# Patient Record
Sex: FEMALE | Race: WHITE
[De-identification: names, ages, dates, MRNs, and addresses within clinical notes are randomized per-mention and may not be internally consistent; named-entity substitution may affect disease eponyms.]

---

## 2017-02-19 NOTE — RAD
CLINICAL HISTORY: Right flank pain



COMPARISON: July 14, 2015



TECHNIQUE: Multiple contiguous axial CT scans were obtained of the abdomen and pelvis,

without intravenous contrast enhancement. Coronal and sagittal multiplanar reformations

are submitted for review.  Oral contrast was not administered.     



FINDINGS: 

The study is limited by the lack of intravenous contrast. This limits evaluation of the

solid organs and vasculature.





LUNG BASES: The lung bases are clear.



LIVER: The liver is diffusely low in attenuation compared to the spleen. There are no

focal hepatic parenchymal masses.

BILE DUCTS: There is no intrahepatic or extrahepatic biliary dilatation.

GALLBLADDER: The gallbladder is normal, without pericholecystic inflammatory change.



PANCREAS: The pancreas is normal, without mass or ductal dilatation.

SPLEEN: Normal in size and appearance.



UPPER GI TRACT: Evaluation of the gastrointestinal tract is limited by incomplete gastric

distention. There is a 1.7 cm diverticulum of the second stage of the duodenum.

SMALL BOWEL AND MESENTERY: The small bowel is normal in contour, course, and caliber.

There is no obstruction or dilatation.

COLON: There are multiple diverticula of the descending and sigmoid colon. There is no

pericolonic inflammatory change. There is a tubular, vermiform, hollow viscus that is

blind ending, and originates from the cecum, consistent with a normal appendix. There is

no periappendiceal inflammatory change. This is best seen on axial images 111 through 120



ADRENALS: Normal bilaterally.

KIDNEYS: A simple right renal cyst is noted. There is no appreciable hydronephrosis or

nephrolithiasis

BLADDER: The bladder is smooth in contour.



PELVIC ORGANS: The pelvic organs are not visualized.



AORTA: The aorta is normal.

IVC: Unremarkable



LYMPH NODES: There is no lymphadenopathy by size criteria.



ABDOMINAL WALL: There is no evidence for abdominal wall hernia.

BONES AND SOFT TISSUES: There are mild diffuse degenerative changes.

OTHER: None



IMPRESSION:

1.  NO HYDRONEPHROSIS OR NEPHROLITHIASIS.

2.  FATTY INFILTRATION OF LIVER.

3.  DIVERTICULOSIS

## 2017-02-19 NOTE — ED
Tamela CORTES Janilya, scribed for William Jones MD on 02/19/17 at 1752 .





Abdominal Pain/Female





- HPI Summary


HPI Summary: 


A 65 y/o female came in to [facility] presenting w/ a gradual onset of constant 

right sided back pain under the ribs starting Tuesday, February 14, 2017. She 

was seen by her PCP on the same day of onset of the pain. Shingles was ruled 

out because there were no rashes. Her urinalysis showed e.coli and was put on 

Abx. For pain management, she was put on Vicodin. 


Pt states the pain is very severe of 8/10 rating. It is localized in the back, 

and sometimes, it radiates to the her right flank. Vicodin temporarily 

decreases the pain to 3/10. Touch makes the pain worse. There is also very mild 

fever. Pt denies diarrhea, on the opposite, she's had difficulty moving bowels, 

and so she's been taking a stool softener. Pt denies dysuria, muscle spasms. 


Upon examination here in the room, there are a few non-prominent pinhead rashes 

on the right side of her back and a long erythematous rash across her right 

flank. 





PMHx ulcerative colitis 10 years ago.





- History of Current Complaint


Chief Complaint: EDGeneral


Stated Complaint: LT FLANK PAIN


Time Seen by Provider: 02/19/17 17:49


Hx Obtained From: Patient


Onset/Duration: Gradual Onset, Lasting Days, Still Present


Timing: Constant


Severity Initially: Moderate


Severity Currently: Moderate


Pain Intensity: 7


Pain Scale Used: 0-10 Numeric


Location: Discrete At: RUQ, Flank


Radiates: No


Aggravating Factor(s): Other: - Touch


Alleviating Factor(s): Medications - Vicodin


Allergies/Adverse Reactions: 


 Allergies











Allergy/AdvReac Type Severity Reaction Status Date / Time


 


Amoxicillin Allergy Severe Rash Verified 07/14/15 13:12


 


Adhesive Tape Allergy Intermediate Rash Verified 07/14/15 13:12


 


Clopidogrel [From Plavix] Allergy  Unknown Verified 05/31/16 16:22





   Reaction  





   Details  


 


Sucrose Allergy  Unknown Verified 05/31/16 16:22





   Reaction  





   Details  


 


Pregabalin [From Lyrica] AdvReac Intermediate Altered Verified 07/14/15 13:12





   Mental  





   Status  


 


Quinolones AdvReac Intermediate See Comment Verified 07/14/15 13:12


 


NSAIDs AdvReac Unknown See Comment Verified 07/14/15 13:12


 


HFA Allergy  Coughing Uncoded 07/14/15 13:12


 


beta blockers AdvReac Severe See Comment Uncoded 07/14/15 13:12


 


keytac AdvReac Intermediate Diarrhea Uncoded 07/14/15 13:12














PMH/Surg Hx/FS Hx/Imm Hx


Previously Healthy: Yes


Endocrine/Hematology History: Reports: Hx Thyroid Disease - hyperthyroid


   Denies: Hx Diabetes


Cardiovascular History: Reports: Other Cardiovascular Problems/Disorders - 

cardiomyopathy


   Denies: Hx Congestive Heart Failure, Hx Hypertension - BORDERLINE, Hx 

Pacemaker/ICD


Respiratory History: Reports: Hx Asthma


   Denies: Hx Chronic Obstructive Pulmonary Disease (COPD), Other Respiratory 

Problems/Disorders


GI History: Reports: Hx Diverticulosis - and diverticulitis


   Denies: Hx Ulcer


 History: Reports: Other  Problems/Disorders - bladder prolapse


   Denies: Hx Renal Disease


Musculoskeletal History: Reports: Hx Arthritis


   Denies: Hx Rheumatoid Arthritis, Hx Osteoporosis


Sensory History: Reports: Hx Contacts or Glasses


   Denies: Hx Hearing Aid


Opthamlomology History: Reports: Hx Contacts or Glasses


Neurological History: Reports: Other Neuro Impairments/Disorders - L5 nerve 

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


Psychiatric History: Reports: Hx Panic Disorder





- Cancer History


Hx Chemotherapy: No





- Surgical History


Surgery Procedure, Year, and Place: Hysterectomy Feb 08; bladder biopsy, 

BLADDER MESH (PROLAPSE); tubal; sinus surgery; heart cath (no stents);


Infectious Disease History: No


Infectious Disease History: Reports: Hx Hepatitis - Hep A, Infectious in college


   Denies: Hx Human Immunodeficiency Virus (HIV), History Other Infectious 

Disease, Traveled Outside the [place] in Last 30 Days





- Family History


Known Family History: Positive: Hypertension, Other - autoimmune conditions


Family History: R & n/C





- Social History


Occupation: Retired


Lives: With Family


Alcohol Use: None


Hx Substance Use: No


Substance Use Type: Reports: None


Hx Tobacco Use: Yes


Smoking Status (MU): Former Smoker





Review of Systems


Positive: Fever - mild fever


Positive: Other - flank pain


Positive: Arthralgia - back pain on the right, Myalgia - back pain on the right


Positive: Rash - few rashes on right side of back and right flank


All Other Systems Reviewed And Are Negative: Yes





Physical Exam


Triage Information Reviewed: Yes


Vital Signs On Initial Exam: 


 Initial Vitals











Temp Pulse Resp BP Pulse Ox


 


 97.9 F   96   20   174/86   97 


 


 02/19/17 16:04  02/19/17 16:04  02/19/17 16:04  02/19/17 16:04  02/19/17 16:04











Vital Signs Reviewed: Yes


Appearance: Positive: Well-Appearing, No Pain Distress


Skin: Positive: Warm, Skin Color Reflects Adequate Perfusion, Dry, Other - 

Erythematous rash on right lower lateral chest of 8 cm by 4 cm measurements


Head/Face: Positive: Normal Head/Face Inspection


Eyes: Positive: EOMI, MACY


ENT: Positive: Normal ENT inspection


Neck: Positive: Supple, Nontender


Respiratory/Lung Sounds: Positive: Clear to Auscultation, Breath Sounds Present


Cardiovascular: Positive: RRR


Abdomen Description: Positive: Nontender, Soft


Bowel Sounds: Positive: Present


Musculoskeletal: Positive: Normal, Strength/ROM Intact


Neurological: Positive: Normal, Sensory/Motor Intact, Alert, Oriented to Person 

Place, Time


Psychiatric: Positive: Affect/Mood Appropriate





Diagnostics





- Vital Signs


 Vital Signs











  Temp Pulse Resp BP Pulse Ox


 


 02/19/17 17:23  97.6 F  91  20  171/90  97


 


 02/19/17 16:04  97.9 F  96  20  174/86  97














- Laboratory


Lab Results: 


 Lab Results











  02/19/17 02/19/17 02/19/17 Range/Units





  18:20 18:20 18:20 


 


WBC  14.9 H    (3.5-10.8)  10^3/ul


 


RBC  4.75    (4.0-5.4)  10^6/ul


 


Hgb  13.9    (12.0-16.0)  g/dl


 


Hct  42    (35-47)  %


 


MCV  88    (80-97)  fL


 


MCH  29    (27-31)  pg


 


MCHC  33    (31-36)  g/dl


 


RDW  14    (10.5-15)  %


 


Plt Count  290    (150-450)  10^3/ul


 


MPV  8    (7.4-10.4)  um3


 


Neut % (Auto)  86.5 H    (38-83)  %


 


Lymph % (Auto)  10.6 L    (25-47)  %


 


Mono % (Auto)  2.2    (1-9)  %


 


Eos % (Auto)  0.1    (0-6)  %


 


Baso % (Auto)  0.6    (0-2)  %


 


Absolute Neuts (auto)  12.9 H    (1.5-7.7)  10^3/ul


 


Absolute Lymphs (auto)  1.6    (1.0-4.8)  10^3/ul


 


Absolute Monos (auto)  0.3    (0-0.8)  10^3/ul


 


Absolute Eos (auto)  0    (0-0.6)  10^3/ul


 


Absolute Basos (auto)  0.1    (0-0.2)  10^3/ul


 


Absolute Nucleated RBC  0    10^3/ul


 


Nucleated RBC %  0    


 


Sodium   135   (133-145)  mmol/L


 


Potassium   3.7   (3.5-5.0)  mmol/L


 


Chloride   102   (101-111)  mmol/L


 


Carbon Dioxide   28   (22-32)  mmol/L


 


Anion Gap   5   (2-11)  mmol/L


 


BUN   20   (6-24)  mg/dL


 


Creatinine   0.81   (0.51-0.95)  mg/dL


 


Est GFR ( Amer)   91.3   (>60)  


 


Est GFR (Non-Af Amer)   71.0   (>60)  


 


BUN/Creatinine Ratio   24.7 H   (8-20)  


 


Glucose   124 H   ()  mg/dL


 


Lactic Acid    1.0  (0.5-2.0)  mmol/L


 


Calcium   9.6   (8.6-10.3)  mg/dL


 


Total Bilirubin   0.40   (0.2-1.0)  mg/dL


 


AST   19   (13-39)  U/L


 


ALT   17   (7-52)  U/L


 


Alkaline Phosphatase   66   ()  U/L


 


C-Reactive Protein   3.86   (< 5.00)  mg/L


 


Total Protein   7.2   (6.4-8.9)  g/dL


 


Albumin   4.1   (3.2-5.2)  g/dL


 


Globulin   3.1   (2-4)  g/dL


 


Albumin/Globulin Ratio   1.3   (1-3)  


 


Lipase   24   (11.0-82.0)  U/L


 


Urine Color     


 


Urine Appearance     


 


Urine pH     (5-9)  


 


Ur Specific Gravity     (1.010-1.030)  


 


Urine Protein     (Negative)  


 


Urine Ketones     (Negative)  


 


Urine Blood     (Negative)  


 


Urine Nitrate     (Negative)  


 


Urine Bilirubin     (Negative)  


 


Urine Urobilinogen     (Negative)  


 


Ur Leukocyte Esterase     (Negative)  


 


Urine Glucose     (Negative)  














  02/19/17 Range/Units





  19:08 


 


WBC   (3.5-10.8)  10^3/ul


 


RBC   (4.0-5.4)  10^6/ul


 


Hgb   (12.0-16.0)  g/dl


 


Hct   (35-47)  %


 


MCV   (80-97)  fL


 


MCH   (27-31)  pg


 


MCHC   (31-36)  g/dl


 


RDW   (10.5-15)  %


 


Plt Count   (150-450)  10^3/ul


 


MPV   (7.4-10.4)  um3


 


Neut % (Auto)   (38-83)  %


 


Lymph % (Auto)   (25-47)  %


 


Mono % (Auto)   (1-9)  %


 


Eos % (Auto)   (0-6)  %


 


Baso % (Auto)   (0-2)  %


 


Absolute Neuts (auto)   (1.5-7.7)  10^3/ul


 


Absolute Lymphs (auto)   (1.0-4.8)  10^3/ul


 


Absolute Monos (auto)   (0-0.8)  10^3/ul


 


Absolute Eos (auto)   (0-0.6)  10^3/ul


 


Absolute Basos (auto)   (0-0.2)  10^3/ul


 


Absolute Nucleated RBC   10^3/ul


 


Nucleated RBC %   


 


Sodium   (133-145)  mmol/L


 


Potassium   (3.5-5.0)  mmol/L


 


Chloride   (101-111)  mmol/L


 


Carbon Dioxide   (22-32)  mmol/L


 


Anion Gap   (2-11)  mmol/L


 


BUN   (6-24)  mg/dL


 


Creatinine   (0.51-0.95)  mg/dL


 


Est GFR ( Amer)   (>60)  


 


Est GFR (Non-Af Amer)   (>60)  


 


BUN/Creatinine Ratio   (8-20)  


 


Glucose   ()  mg/dL


 


Lactic Acid   (0.5-2.0)  mmol/L


 


Calcium   (8.6-10.3)  mg/dL


 


Total Bilirubin   (0.2-1.0)  mg/dL


 


AST   (13-39)  U/L


 


ALT   (7-52)  U/L


 


Alkaline Phosphatase   ()  U/L


 


C-Reactive Protein   (< 5.00)  mg/L


 


Total Protein   (6.4-8.9)  g/dL


 


Albumin   (3.2-5.2)  g/dL


 


Globulin   (2-4)  g/dL


 


Albumin/Globulin Ratio   (1-3)  


 


Lipase   (11.0-82.0)  U/L


 


Urine Color  Yellow  


 


Urine Appearance  Clear  


 


Urine pH  7.0  (5-9)  


 


Ur Specific Gravity  1.010  (1.010-1.030)  


 


Urine Protein  Negative  (Negative)  


 


Urine Ketones  Negative  (Negative)  


 


Urine Blood  Negative  (Negative)  


 


Urine Nitrate  Negative  (Negative)  


 


Urine Bilirubin  Negative  (Negative)  


 


Urine Urobilinogen  Negative  (Negative)  


 


Ur Leukocyte Esterase  Negative  (Negative)  


 


Urine Glucose  Negative  (Negative)  











Result Diagrams: 


 02/19/17 18:20





 02/19/17 18:20


Lab Statement: Any lab studies that have been ordered have been reviewed, and 

results considered in the medical decision making process.





- CT


  ** abd/pel


CT Interpretation: Positive (See Comments) - IMPRESSION: 1.  NO HYDRONEPHROSIS 

OR NEPHROLITHIASIS. 2.  FATTY INFILTRATION OF LIVER. 3.  DIVERTICULOSIS


CT Interpretation Completed By: Radiologist





Abdominal Pain Fem Course/Dx





- Course


Course Of Treatment: RASH FOUND ON RT LOWER LATERAL CHEST WHICH MAY REPRESENT 

SHINGLES. DISCUSSED RESULTS WITH PATIENT. SHE WILL CONTINUE THE MACROBID AND 

ANTIVIRAL. WE DISCUSSED INCREASING HER NORCO 5/325MG TO Q 4 HOURS PRN. 

DISCHARGE HOME STABLE.





- Diagnoses


Provider Diagnoses: 


 Flank pain, Shingles








Discharge





- Discharge Plan


Condition: Stable


Disposition: HOME


Patient Education Materials:  Shingles (ED), Flank Pain (ED)


Referrals: 


Rigo Ayala MD [Primary Care Provider] - 


Additional Instructions: 


FOLLOW UP WITH YOUR DOCTOR.


YOU CAN TAKE YOUR NORCO 5/325MG EVERY 4 HOURS AS NEEDED.


RETURN TO THE EMERGENCY DEPARTMENT FOR ANY WORSENING OF YOUR CONDITION; PAIN, 

FEVER, YOU FEEL ILL OR QUESTIONS OR CONCERNS.





The documentation as recorded by the Tamela king Janilya accurately 

reflects the service I personally performed and the decisions made by me, 

William Jones MD.

## 2017-02-23 NOTE — ED
Jean Claude CORTES Billy, scribed for William Jones MD on 02/23/17 at 1841 .





Complex/Multi-Sys Presentation





- HPI Summary


HPI Summary: 


Patient is a 66 year-old female coming to [facility] presenting with increased pain 

and rash which she believes is due to shingles. Rash is located on the right-

sided thoracic back region, where she complains of pain "like a knife in her 

back." She also complains of right-sided abdominal pain, worse with palpation. 

She states that she has had no changes in appetite or bladder/bowel movements. 

Her pain has been improved with oxycodone; pain severity 8/10 in the morning, 

but reduced to 5/10 with oxycodone.





- History Of Current Complaint


Chief Complaint: EDGeneral


Time Seen by Provider: 02/23/17 18:30


Hx Obtained From: Patient


Onset/Duration: Gradual Onset, Lasting Days, Still Present


Timing: Constant


Severity Currently: Moderate


Severity Initially: Moderate


Location: Pain At: - right-sided mid-back and right-sided abdomen


Aggravating Factor(s): palpation makes abdominal pain worse


Alleviating Factor(s): oxycodone


Associated Signs And Symptoms: Positive: Abdominal Pain, Other - rash





- Allergies/Home Medications


Allergies/Adverse Reactions: 


 Allergies











Allergy/AdvReac Type Severity Reaction Status Date / Time


 


Amoxicillin Allergy Severe Rash Verified 07/14/15 13:12


 


Adhesive Tape Allergy Intermediate Rash Verified 07/14/15 13:12


 


Clopidogrel [From Plavix] Allergy  Unknown Verified 05/31/16 16:22





   Reaction  





   Details  


 


Sucrose Allergy  Unknown Verified 05/31/16 16:22





   Reaction  





   Details  


 


Pregabalin [From Lyrica] AdvReac Intermediate Altered Verified 07/14/15 13:12





   Mental  





   Status  


 


Quinolones AdvReac Intermediate See Comment Verified 07/14/15 13:12


 


NSAIDs AdvReac Unknown See Comment Verified 07/14/15 13:12


 


HFA Allergy  Coughing Uncoded 07/14/15 13:12


 


beta blockers AdvReac Severe See Comment Uncoded 07/14/15 13:12


 


keytac AdvReac Intermediate Diarrhea Uncoded 07/14/15 13:12














PMH/Surg Hx/FS Hx/Imm Hx


Endocrine/Hematology History: Reports: Hx Thyroid Disease - hyperthyroid


   Denies: Hx Diabetes


Cardiovascular History: Reports: Other Cardiovascular Problems/Disorders - 

cardiomyopathy


   Denies: Hx Congestive Heart Failure, Hx Hypertension - BORDERLINE, Hx 

Pacemaker/ICD


Respiratory History: Reports: Hx Asthma


   Denies: Hx Chronic Obstructive Pulmonary Disease (COPD), Other Respiratory 

Problems/Disorders


GI History: Reports: Hx Diverticulosis - and diverticulitis


   Denies: Hx Ulcer


 History: Reports: Other  Problems/Disorders - bladder prolapse


   Denies: Hx Renal Disease


Musculoskeletal History: Reports: Hx Arthritis


   Denies: Hx Rheumatoid Arthritis, Hx Osteoporosis


Sensory History: Reports: Hx Contacts or Glasses


   Denies: Hx Hearing Aid


Opthamlomology History: Reports: Hx Contacts or Glasses


Neurological History: Reports: Other Neuro Impairments/Disorders - L5 nerve 

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


Psychiatric History: Reports: Hx Panic Disorder





- Cancer History


Hx Chemotherapy: No





- Surgical History


Surgery Procedure, Year, and Place: Hysterectomy Feb 08; bladder biopsy, 

BLADDER MESH (PROLAPSE); tubal; sinus surgery; heart cath (no stents);


Infectious Disease History: No


Infectious Disease History: Reports: Hx Hepatitis - Hep A, Infectious in college


   Denies: Hx Human Immunodeficiency Virus (HIV), History Other Infectious 

Disease, Traveled Outside the [place] in Last 30 Days





- Family History


Known Family History: Positive: Hypertension, Other - autoimmune conditions





- Social History


Alcohol Use: None


Hx Substance Use: No


Substance Use Type: Reports: None


Hx Tobacco Use: Yes


Smoking Status (MU): Former Smoker





Review of Systems


Positive: Abdominal Pain


Positive: Rash, Other - pain d/t shingles in the back


Neurological: Other - "vertigo"


All Other Systems Reviewed And Are Negative: Yes





Physical Exam


Triage Information Reviewed: Yes


Vital Signs On Initial Exam: 


 Initial Vitals











Temp Pulse Resp BP Pulse Ox


 


 99.9 F   90   20   162/70   97 


 


 02/23/17 14:49  02/23/17 14:49  02/23/17 14:49  02/23/17 14:49  02/23/17 14:49











Vital Signs Reviewed: Yes


Appearance: Positive: Well-Appearing, No Pain Distress


Skin: Positive: Warm, Skin Color Reflects Adequate Perfusion, Dry, Other - 

Approximately a dozen punctate erythematous lesions on the right lower thoracic 

skin.


Head/Face: Positive: Normal Head/Face Inspection


Eyes: Positive: EOMI, MACY


ENT: Positive: Normal ENT inspection


Neck: Positive: Supple, Nontender


Respiratory/Lung Sounds: Positive: Clear to Auscultation, Breath Sounds Present


Cardiovascular: Positive: RRR


Abdomen Description: Positive: Soft, Other: - Tenderness of the right-sided 

abdomen.


Musculoskeletal: Positive: Normal, Strength/ROM Intact


Neurological: Positive: Normal, Sensory/Motor Intact, Alert, Oriented to Person 

Place, Time


Psychiatric: Positive: Affect/Mood Appropriate





Diagnostics





- Vital Signs


 Vital Signs











  Temp Pulse Resp BP Pulse Ox


 


 02/23/17 17:35  98.4 F  67  16  151/66  98


 


 02/23/17 16:15  98.4 F  71  18  165/71  98


 


 02/23/17 14:49  99.9 F  90  20  162/70  97














- Laboratory


Lab Statement: Any lab studies that have been ordered have been reviewed, and 

results considered in the medical decision making process.





Complex Multi-Symp Course/Dx


Assessment/Plan: WELL IN ED. DISCHARGE HOME STABLE.





- Diagnoses


Provider Diagnoses: 


 Shingles, Vertigo








Discharge





- Discharge Plan


Condition: Stable


Disposition: HOME


Prescriptions: 


Diazepam TAB(*) [Valium TAB(*)] 5 mg PO TID PRN #15 tab MDD 3


 PRN Reason: Vertigo


Meclizine TAB* [Antivert 12.5 TAB*] 25 mg PO TID PRN #15 tab


 PRN Reason: Dizziness


oxyCODONE/Acetamin 10/325(NF) [Percocet 10/325 (NF)] 1 tab PO Q4HR PRN #20 tab 

MDD 6


 PRN Reason: Pain


Patient Education Materials:  Shingles (ED), Vertigo (ED)


Referrals: 


Rigo Ayala MD [Primary Care Provider] - 


Additional Instructions: 


FOLLOW UP WITH YOUR DOCTOR.


RETURN TO THE EMERGENCY DEPARTMENT FOR ANY WORSENING OF YOUR CONDITION OR 

QUESTIONS OR CONCERNS.





The documentation as recorded by the Jean Claude king Billy accurately reflects the 

service I personally performed and the decisions made by me, William Jones MD.

## 2017-05-21 NOTE — RAD
INDICATION: Pain and swelling.     



COMPARISON: January 18, 2008

 

TECHNIQUE: Duplex interrogation of the Lowerextremity was performed.



FINDINGS: 



Deep veins: The common femoral, great saphenous, profunda femoris, proximal, mid, and

distal deep femoral, popliteal, posterior tibial, and peroneal veins are patent. There is

normal compressibility, augmentation, and phasic flow.



Superficial veins: There are no findings of superficial thrombophlebitis.



Popliteal fossa:There is no evidence of a popliteal cyst.



Soft tissues:There are no soft tissue abnormalities.



IMPRESSION:  No evidence of deep venous thrombosis

## 2017-05-21 NOTE — ED
Lower Extremity





- HPI Summary


HPI Summary: 


Patient presents with acute onset of right upper lateral calf pain and swelling 

that began without known incident. She has been treated for right hip pain over 

the last month and recieved a cortisone inject on Friday. She was told to "take 

it easy" and today was the first day she has been really walking since the 

shot. She was walking when the pain began and has it has been intermittent 

since then. She has a significant heart history and worries she may have a 

blood clot. She denies warmth, redness, SOB or CP. 





- History of Current Complaint


Chief Complaint: EDExtremityLower


Stated Complaint: PAIN AND SWELLING/RT KNEE


Time Seen by Provider: 05/21/17 21:18


Hx Obtained From: Patient, Family/Caretaker


Mechanism Of Injury: Unknown


Onset of Pain: Hours


Onset/Duration: Still Present


Severity Initially: Severe


Severity Currently: Mild


Pain Intensity: 0


Timing: Intermittent, Lasting Minutes


Location: Is Discrete @ - right proximal lateral calf


Character Of Pain: Sharp, Aching


Associated Signs And Symptoms: Positive: Swelling - mild


Aggravating Factor(s): Ambulation, Other - touch


Alleviating Factor(s): Nothing


Able to Bear Weight: Yes





- Allergies/Home Medications


Allergies/Adverse Reactions: 


 Allergies











Allergy/AdvReac Type Severity Reaction Status Date / Time


 


Amoxicillin Allergy Severe Rash Verified 05/21/17 21:37


 


Adhesive Tape Allergy Intermediate Rash Verified 05/21/17 21:37


 


Clopidogrel [From Plavix] Allergy  Unknown Verified 05/21/17 21:37





   Reaction  





   Details  


 


Nitrofurantoin Allergy  Bleeding Verified 05/21/17 21:37


 


Sucrose Allergy  Unknown Verified 05/21/17 21:37





   Reaction  





   Details  


 


Pregabalin [From Lyrica] AdvReac Intermediate Altered Verified 05/21/17 21:37





   Mental  





   Status  


 


Quinolones AdvReac Intermediate See Comment Verified 05/21/17 21:37


 


NSAIDs AdvReac Unknown See Comment Verified 05/21/17 21:37


 


HFA Allergy  Coughing Uncoded 07/14/15 13:12


 


beta blockers AdvReac Severe See Comment Uncoded 07/14/15 13:12


 


keytac AdvReac Intermediate Diarrhea Uncoded 07/14/15 13:12














PMH/Surg Hx/FS Hx/Imm Hx


Endocrine/Hematology History: Reports: Hx Thyroid Disease - hyperthyroid


   Denies: Hx Diabetes


Cardiovascular History: Reports: Other Cardiovascular Problems/Disorders - hx 

viral cardiomyopathy 2004,  cured


   Denies: Hx Congestive Heart Failure, Hx Hypertension - BORDERLINE, Hx 

Pacemaker/ICD


Respiratory History: Reports: Hx Asthma - under control with steroid inhalers


   Denies: Hx Chronic Obstructive Pulmonary Disease (COPD), Other Respiratory 

Problems/Disorders


GI History: Reports: Hx Diverticulosis - and diverticulitis


   Denies: Hx Ulcer


 History: Reports: Other  Problems/Disorders - bladder prolapse


   Denies: Hx Renal Disease


Musculoskeletal History: Reports: Hx Arthritis


   Denies: Hx Rheumatoid Arthritis, Hx Osteoporosis


Sensory History: Reports: Hx Contacts or Glasses


   Denies: Hx Hearing Aid


Opthamlomology History: Reports: Hx Contacts or Glasses


Neurological History: Reports: Other Neuro Impairments/Disorders - L5 nerve 

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


Psychiatric History: Reports: Hx Panic Disorder





- Cancer History


Hx Chemotherapy: No





- Surgical History


Surgery Procedure, Year, and Place: Hysterectomy Feb 08; bladder biopsy, 

BLADDER MESH (PROLAPSE); tubal; sinus surgery; heart cath (no stents);


Infectious Disease History: No


Infectious Disease History: Reports: Hx Hepatitis - Hep A, Infectious in college


   Denies: Hx Human Immunodeficiency Virus (HIV), History Other Infectious 

Disease, Traveled Outside the [place] in Last 30 Days





- Family History


Known Family History: Positive: None, Hypertension, Other - autoimmune 

conditions


Family History: R & n/C





- Social History


Occupation: Retired


Lives: With Family


Alcohol Use: None


Hx Substance Use: No


Substance Use Type: Reports: None


Hx Tobacco Use: Yes


Smoking Status (MU): Former Smoker





Review of Systems


Negative: Fever, Chills


Negative: Chest Pain


Negative: Shortness Of Breath


Positive: Myalgia, Edema - mild.  Negative: Decreased ROM


Negative: Paresthesia, Numbness


All Other Systems Reviewed And Are Negative: Yes





Physical Exam


Triage Information Reviewed: Yes


Vital Signs On Initial Exam: 


 Initial Vitals











Temp Pulse Resp BP Pulse Ox


 


 97.9 F   80   16   159/76   96 


 


 05/21/17 21:12  05/21/17 21:12  05/21/17 21:12  05/21/17 21:12  05/21/17 21:12











Vital Signs Reviewed: Yes


Appearance: Positive: Well-Appearing, Well-Nourished, Pain Distress


Skin: Positive: Warm, Skin Color Reflects Adequate Perfusion, Dry, Soft


Head/Face: Positive: Normal Head/Face Inspection


Eyes: Positive: EOMI, MACY, Conjunctiva Clear


ENT: Positive: Hearing grossly normal


Respiratory/Lung Sounds: Positive: Breath Sounds Present


Cardiovascular: Positive: RRR


Musculoskeletal: Positive: Strength/ROM Intact, Pain @ - TTP med/lat joint lines

, LCL and anterolateral proximal calf; stable varus/valgus stress;neg patellar 

grind; negative drew calf pain, Edema Right - mild edema right anterolateral 

proximal calf


Neurological: Positive: Sensory/Motor Intact, Alert, Oriented to Person Place, 

Time, NV Bundle Intact Distally, Normal Gait


Psychiatric: Positive: Affect/Mood Appropriate


AVPU Assessment: Alert





Diagnostics





- Vital Signs


 Vital Signs











  Temp Pulse Resp BP Pulse Ox


 


 05/21/17 23:13  98 F  76  18  148/76 


 


 05/21/17 21:12  97.9 F  80  16  159/76  96














- Laboratory


Lab Statement: Any lab studies that have been ordered have been reviewed, and 

results considered in the medical decision making process.





- Ultrasound


  ** No standard instances


Ultrasound Interpretation: No Acute Changes


Ultrasound Interpretation Completed By: Radiologist





Lower Extremity Course/Dx





- Diagnoses


Differential Diagnosis/HQI/PQRI: Positive: Arthritis, Bursitis, Contusion, DVT, 

Fracture (Closed), Sprain, Strain


Provider Diagnoses: 


 Right calf pain








Discharge





- Discharge Plan


Condition: Stable


Disposition: HOME


Patient Education Materials:  Muscle Strain (ED)


Referrals: 


Rigo Ayala MD [Primary Care Provider] - 


Additional Instructions: 


Please rest, use Voltaren, ice and/or heat as needed for comfort. Return to the 

emergency department if symptoms worsen.

## 2017-06-19 NOTE — RAD
INDICATION: Chest pain     



COMPARISON: March 23, 2017

 

TECHNIQUE: PA and lateral dual-energy views were obtained.



FINDINGS: 



Bones/Soft Tissues: There are no acute bony findings.    



Cardiomediastinal: The cardiomediastinal silhouette is normal. 



Lungs: There are no infiltrates.



Pleura: There are no pleural effusions. 



Other: None



IMPRESSION:  NO ACTIVE DISEASE.

## 2017-06-20 NOTE — ED
Progress





- Progress Note


Progress Note: 





pt signed out to me by DR. Narvaez, pt awaiting a second troponin.  Before 

leaving Dr. Narvaez was going to put in a cta because of a mildly elevated 

ddimer. The pt is now not wanting to wait for the CTA and upon questioning the 

pt she has no risk factors for PE and she does not have a pleuritic component 

to her pain.  Pt will be discharged home





Re-Evaluation





- Re-Evaluation


  ** First Eval


Re-Evaluation Time: 23:23


Comment: Updated pt. Pending second trop.





Course/Dx





- Course


Course Of Treatment: Ms. Segura presented with an atypical pain that started 

today.  She has been stable in the ED and her initial W/U is nondiagnostic.  

Her WBC's are slightly elevated at 14 and her d-dimer is also at 312.  She has 

reproducible CP but she also has a history of PE and is currently awaiting a 

repeat trop and CTA of her chest. I anticipate she will go home if they are 

negative with a chest wall pain.





- Diagnoses


Provider Diagnoses: 


 Chest pain

## 2017-06-20 NOTE — ED
Jhon CORTES Salem, scribed for Vinod Narvaez MD on 06/19/17 at 2056 .





HPI Chest Pain





- HPI Summary


HPI Summary: 





Patient is a 68 y/o F who presents to the ED with intermittent squeezing mid-

sternal CP for the last 1.5 hours. She reports mild nausea, but denies edema. 

She also denies taking anything for pain, but reports that CP worsened when she 

began to cry. Pt was seen in the ED before for CP. PMHx of CVA and PE. 





- History of Current Complaint


Chief Complaint: EDChestPainROMI


Time Seen by Provider: 06/19/17 20:51


Hx Obtained From: Patient


Onset/Duration: Started Hours Ago, Atraumatic, Still Present


Timing: Intermittent


Initial Severity: Moderate


Current Severity: Moderate


Pain Intensity: 4


Pain Scale Used: 0-10 Numeric


Chest Pain Location: Mid Sternal


Chest Pain Radiates: No


Character: Pressure/Squeezing


Aggravating Factor(s): Other: - Crying.


Alleviating Factor(s): Nothing


Associated Signs and Symptoms: Positive: Chest Pain, Nausea, Edema





- Additional Pertinent History


Primary Care Physician: [id number]





- Allergy/Home Medications


Allergies/Adverse Reactions: 


 Allergies











Allergy/AdvReac Type Severity Reaction Status Date / Time


 


Amoxicillin Allergy Severe Rash Verified 06/19/17 20:39


 


Adhesive Tape Allergy Intermediate Rash Verified 06/19/17 20:39


 


Clopidogrel [From Plavix] Allergy  Unknown Verified 06/19/17 20:39





   Reaction  





   Details  


 


Nitrofurantoin Allergy  Bleeding Verified 06/19/17 20:39


 


Sucrose Allergy  Unknown Verified 06/19/17 20:39





   Reaction  





   Details  


 


Pregabalin [From Lyrica] AdvReac Intermediate Altered Verified 06/19/17 20:39





   Mental  





   Status  


 


Quinolones AdvReac Intermediate See Comment Verified 06/19/17 20:39


 


NSAIDs AdvReac Unknown See Comment Verified 06/19/17 20:39


 


HFA Allergy  Coughing Uncoded 06/19/17 20:39


 


beta blockers AdvReac Severe See Comment Uncoded 06/19/17 20:39


 


keytac AdvReac Intermediate Diarrhea Uncoded 06/19/17 20:39














PMH/Surg Hx/FS Hx/Imm Hx


Endocrine/Hematology History: Reports: Hx Thyroid Disease - hyperthyroid


   Denies: Hx Diabetes


Cardiovascular History: Reports: Other Cardiovascular Problems/Disorders - hx 

viral cardiomyopathy 2004,  cured


   Denies: Hx Congestive Heart Failure, Hx Hypertension - BORDERLINE, Hx 

Pacemaker/ICD


Respiratory History: Reports: Hx Asthma - under control with steroid inhalers


   Denies: Hx Chronic Obstructive Pulmonary Disease (COPD), Other Respiratory 

Problems/Disorders


GI History: Reports: Hx Diverticulosis - and diverticulitis


   Denies: Hx Ulcer


 History: Reports: Other  Problems/Disorders - bladder prolapse


   Denies: Hx Renal Disease


Musculoskeletal History: Reports: Hx Arthritis


   Denies: Hx Rheumatoid Arthritis, Hx Osteoporosis


Sensory History: Reports: Hx Contacts or Glasses


   Denies: Hx Hearing Aid


Opthamlomology History: Reports: Hx Contacts or Glasses


Neurological History: Reports: Other Neuro Impairments/Disorders - L5 nerve 

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


Psychiatric History: Reports: Hx Panic Disorder





- Cancer History


Hx Chemotherapy: No





- Surgical History


Surgery Procedure, Year, and Place: Hysterectomy Feb 08; bladder biopsy, 

BLADDER MESH (PROLAPSE); tubal; sinus surgery; heart cath (no stents);


Infectious Disease History: No


Infectious Disease History: Reports: Hx Hepatitis - Hep A, Infectious in college


   Denies: Hx Human Immunodeficiency Virus (HIV), History Other Infectious 

Disease, Traveled Outside the [place] in Last 30 Days





- Family History


Known Family History: Positive: Hypertension, Other - autoimmune conditions





- Social History


Alcohol Use: None


Hx Substance Use: No


Substance Use Type: Reports: None


Hx Tobacco Use: Yes


Smoking Status (MU): Former Smoker





Review of Systems


Negative: Fever


Positive: Chest Pain


Positive: Nausea


Negative: Edema


All Other Systems Reviewed And Are Negative: Yes





Physical Exam


Triage Information Reviewed: Yes


Vital Signs On Initial Exam: 


 Initial Vitals











Temp Pulse Resp BP Pulse Ox


 


 97.8 F   95   18   143/73   99 


 


 06/19/17 20:40  06/19/17 20:40  06/19/17 20:40  06/19/17 20:40  06/19/17 20:40











Vital Signs Reviewed: Yes


Appearance: Positive: Well-Appearing, No Pain Distress


Skin: Positive: Warm, Skin Color Reflects Adequate Perfusion, Dry


Head/Face: Positive: Normal Head/Face Inspection


Eyes: Positive: Normal


Neck: Positive: Supple, Nontender


Respiratory/Lung Sounds: Positive: Clear to Auscultation, Breath Sounds Present

, Other - Tender to right para-sternum area.


Cardiovascular: Positive: RRR


Abdomen Description: Positive: Nontender, Soft


Bowel Sounds: Positive: Present


Musculoskeletal: Positive: Normal


Neurological: Positive: Normal


Psychiatric: Positive: Normal, Affect/Mood Appropriate





Diagnostics





- Vital Signs


 Vital Signs











  Temp Pulse Resp BP Pulse Ox


 


 06/19/17 20:40  97.8 F  95  18  143/73  99














- Laboratory


Lab Results: 


 Lab Results











  06/19/17 06/19/17 06/19/17 Range/Units





  22:10 22:10 22:10 


 


WBC  14.1 H    (3.5-10.8)  10^3/ul


 


RBC  4.64    (4.0-5.4)  10^6/ul


 


Hgb  13.4    (12.0-16.0)  g/dl


 


Hct  41    (35-47)  %


 


MCV  88    (80-97)  fL


 


MCH  29    (27-31)  pg


 


MCHC  33    (31-36)  g/dl


 


RDW  15    (10.5-15)  %


 


Plt Count  353    (150-450)  10^3/ul


 


MPV  8    (7.4-10.4)  um3


 


Neut % (Auto)  67.8    (38-83)  %


 


Lymph % (Auto)  22.1 L    (25-47)  %


 


Mono % (Auto)  6.0    (1-9)  %


 


Eos % (Auto)  3.6    (0-6)  %


 


Baso % (Auto)  0.5    (0-2)  %


 


Absolute Neuts (auto)  9.5 H    (1.5-7.7)  10^3/ul


 


Absolute Lymphs (auto)  3.1    (1.0-4.8)  10^3/ul


 


Absolute Monos (auto)  0.9 H    (0-0.8)  10^3/ul


 


Absolute Eos (auto)  0.5    (0-0.6)  10^3/ul


 


Absolute Basos (auto)  0.1    (0-0.2)  10^3/ul


 


Absolute Nucleated RBC  0.01    10^3/ul


 


Nucleated RBC %  0    


 


D-Dimer, Quantitative   312 H   (Less Than 230)  ng/mL


 


Sodium    137  (133-145)  mmol/L


 


Potassium    3.0 L  (3.5-5.0)  mmol/L


 


Chloride    101  (101-111)  mmol/L


 


Carbon Dioxide    30  (22-32)  mmol/L


 


Anion Gap    6  (2-11)  mmol/L


 


BUN    24  (6-24)  mg/dL


 


Creatinine    0.69  (0.51-0.95)  mg/dL


 


Est GFR ( Amer)    109.5  (>60)  


 


Est GFR (Non-Af Amer)    85.1  (>60)  


 


BUN/Creatinine Ratio    34.8 H  (8-20)  


 


Glucose    88  ()  mg/dL


 


Lactic Acid     (0.5-2.0)  mmol/L


 


Calcium    9.6  (8.6-10.3)  mg/dL


 


Total Bilirubin    0.20  (0.2-1.0)  mg/dL


 


AST    20  (13-39)  U/L


 


ALT    17  (7-52)  U/L


 


Alkaline Phosphatase    66  ()  U/L


 


Troponin I    0.00  (<0.04)  ng/mL


 


Total Protein    7.3  (6.4-8.9)  g/dL


 


Albumin    4.1  (3.2-5.2)  g/dL


 


Globulin    3.2  (2-4)  g/dL


 


Albumin/Globulin Ratio    1.3  (1-3)  














  06/19/17 Range/Units





  22:10 


 


WBC   (3.5-10.8)  10^3/ul


 


RBC   (4.0-5.4)  10^6/ul


 


Hgb   (12.0-16.0)  g/dl


 


Hct   (35-47)  %


 


MCV   (80-97)  fL


 


MCH   (27-31)  pg


 


MCHC   (31-36)  g/dl


 


RDW   (10.5-15)  %


 


Plt Count   (150-450)  10^3/ul


 


MPV   (7.4-10.4)  um3


 


Neut % (Auto)   (38-83)  %


 


Lymph % (Auto)   (25-47)  %


 


Mono % (Auto)   (1-9)  %


 


Eos % (Auto)   (0-6)  %


 


Baso % (Auto)   (0-2)  %


 


Absolute Neuts (auto)   (1.5-7.7)  10^3/ul


 


Absolute Lymphs (auto)   (1.0-4.8)  10^3/ul


 


Absolute Monos (auto)   (0-0.8)  10^3/ul


 


Absolute Eos (auto)   (0-0.6)  10^3/ul


 


Absolute Basos (auto)   (0-0.2)  10^3/ul


 


Absolute Nucleated RBC   10^3/ul


 


Nucleated RBC %   


 


D-Dimer, Quantitative   (Less Than 230)  ng/mL


 


Sodium   (133-145)  mmol/L


 


Potassium   (3.5-5.0)  mmol/L


 


Chloride   (101-111)  mmol/L


 


Carbon Dioxide   (22-32)  mmol/L


 


Anion Gap   (2-11)  mmol/L


 


BUN   (6-24)  mg/dL


 


Creatinine   (0.51-0.95)  mg/dL


 


Est GFR ( Amer)   (>60)  


 


Est GFR (Non-Af Amer)   (>60)  


 


BUN/Creatinine Ratio   (8-20)  


 


Glucose   ()  mg/dL


 


Lactic Acid  0.7  (0.5-2.0)  mmol/L


 


Calcium   (8.6-10.3)  mg/dL


 


Total Bilirubin   (0.2-1.0)  mg/dL


 


AST   (13-39)  U/L


 


ALT   (7-52)  U/L


 


Alkaline Phosphatase   ()  U/L


 


Troponin I   (<0.04)  ng/mL


 


Total Protein   (6.4-8.9)  g/dL


 


Albumin   (3.2-5.2)  g/dL


 


Globulin   (2-4)  g/dL


 


Albumin/Globulin Ratio   (1-3)  











Result Diagrams: 


 06/19/17 22:10





 06/19/17 22:10


Lab Statement: Any lab studies that have been ordered have been reviewed, and 

results considered in the medical decision making process.





- Radiology


  ** CXR


Radiology Interpretation Completed By: Radiologist - IMPRESSION: NO ACTIVE 

DISEASE.





- EKG


  ** 2044


EKG Interpretation: NSR @ 90 bpm. PVC. 





- Additional Comments


Diagnostic Additional Comments: 





Trop 1: 0.00





Re-Evaluation





- Re-Evaluation


  ** First Eval


Re-Evaluation Time: 23:23


Comment: Updated pt. Pending second trop.





Chest Pain Course/Dx





- Course


Course Of Treatment: Ms. Segura presented with an atypical pain that started 

today.  She has been stable in the ED and her initial W/U is nondiagnostic.  

Her WBC's are slightly elevated at 14 and her d-dimer is also at 312.  She has 

reproducible CP but she also has a history of PE and is currently awaiting a 

repeat trop and CTA of her chest. I anticipate she will go home if they are 

negative with a chest wall pain.





- Diagnoses


Provider Diagnoses: 


 Chest pain








Discharge





- Discharge Plan


Condition: Stable


Disposition: OTHER


Discharge Disposition Comment: Sign out to Dr. Ortega. Pending Troponin. 


Patient Education Materials:  Chest Pain (ED)


Referrals: 


Rigo Ayala MD [Primary Care Provider] - 


Additional Instructions: 


Please follow up with your primary care provider. 





The documentation as recorded by the Jhon king Salem accurately reflects 

the service I personally performed and the decisions made by me, Vinod Narvaez MD.

## 2018-09-15 NOTE — UC
Throat Pain/Nasal Marino HPI





- HPI Summary


HPI Summary: 


In Room Note:


The patient is a 67 y/o F presenting to [facility] with a chief complaint of a sore 

throat starting last night. She denies difficulty swallowing, but the pain is 

rated 4/10 in severity. She has been able to eat and drink as normal. She has 

not had any major throat infections. She occasionally has asthmatic and 

bronchitis flare ups. She notes that she has changed her diet and her 

ulcerative colitis and diverticulosis symptoms have diminished, although she 

was having mild abd cramping last night.





MD Note:


Vital signs stable: /87. Visit history: noncontributory to present 

complaint. Asthma and bronchitis.





Nurse's Note:


c/o sore throat for one day.  Arrives with her dog stating, "it is a service dog

".





- History of Current Complaint


Chief Complaint: UCRespiratory


Stated Complaint: SORE THROAT


Time Seen by Provider: 09/15/18 12:54


Hx Obtained From: Patient


Onset/Duration: Sudden Onset, Lasting Hours, Still Present


Severity: Moderate


Pain Intensity: 4


Pain Scale Used: 0-10 Numeric


Cough: None


Associated Signs & Symptoms: Positive: Other - mild diffuse abd cramping.  

Negative: Fever





- Allergies/Home Medications


Allergies/Adverse Reactions: 


 Allergies











Allergy/AdvReac Type Severity Reaction Status Date / Time


 


Adhesive Tape Allergy Intermediate Rash Verified 09/15/18 12:24


 


amoxicillin Allergy  Rash Verified 09/15/18 12:24


 


clopidogrel [From Plavix] Allergy  See Comment Verified 09/15/18 12:24


 


nitrofurantoin Allergy  Bleeding Verified 09/15/18 12:24


 


pregabalin [From Lyrica] Allergy  Altered Verified 09/15/18 12:24





   Mental  





   Status  


 


Quinolones Allergy  See Comment Verified 09/15/18 12:24


 


sucrose Allergy  Unknown Verified 09/15/18 12:24





   Reaction  





   Details  


 


HFA Allergy  Coughing Uncoded 09/15/18 12:24


 


beta blockers AdvReac Severe See Comment Uncoded 09/15/18 12:24


 


keytac AdvReac Intermediate Diarrhea Uncoded 09/15/18 12:24














PMH/Surg Hx/FS Hx/Imm Hx


Endocrine History: Hyperthyroidism


Cardiovascular History: Hypertension - borderline


Respiratory History: Asthma


GI/ History: Other


Other GI/ History: Diverticulosis, Ulcerative Colitis, Bladder prolapse





- Surgical History


Surgical History: Yes


Surgery Procedure, Year, and Place: Hysterectomy Feb 08; bladder biopsy, 

BLADDER MESH (PROLAPSE); tubal; sinus surgery; heart cath (no stents);





- Family History


Known Family History: Positive: Hypertension, Other - autoimmune conditions


Family History: R & n/C





- Social History


Alcohol Use: None


Substance Use Type: None


Smoking Status (MU): Former Smoker





- Immunization History


Most Recent Influenza Vaccination: Fall 2014


Most Recent Tetanus Shot: Within last 10 years


Most Recent Pneumonia Vaccination: None





Review of Systems


Constitutional: Negative


Skin: Negative


Eyes: Negative


ENT: Sore Throat


Respiratory: Negative


Cardiovascular: Negative


Gastrointestinal: Abdominal Pain - mild diffuse abd cramping


Genitourinary: Negative


Motor: Negative


Neurovascular: Negative


Musculoskeletal: Negative


Neurological: Negative


Psychological: Negative


All Other Systems Reviewed And Are Negative: Yes





- Comments


Additional Review of Systems Comments: 


POSITIVE: sore throat, mild diffuse abd cramping; NEGATIVE: fever





Physical Exam





- Summary


Physical Exam Summary: 


Appearance: The patient is well-appearing, is in no pain distress, and is well-

nourished.


Eyes: Conjunctiva are clear.


ENT: The hearing is grossly normal, and the TMs are normal. Mild erythema of 

the pharynx. No exudate. There is no muffled or hoarse voice.


Neck: The neck is supple and there is no lymphadenopathy.


Respiratory: The chest is nontender. The lungs are clear, there are normal 

breath sounds with some mild extended expiratory breathing, and there is no 

respiratory distress.


Cardiovascular: Heart is regular rate and rhythm. There is no murmur.


Abdomen: The abdomen is soft and nontender. There is no organomegaly.


Bowel sounds: present


Musculoskeletal: Strength is intact. The patient moves all extremities. 


Neurological: The patient is alert. Motor and sensory examination grossly 

intact.


Psychological: The patient displays age appropriate behavior


Skin: Negative for rashes.


Triage Information Reviewed: Yes


Vital Signs: 


 Initial Vital Signs











Temp  98.8 F   09/15/18 12:16


 


Pulse  67   09/15/18 12:16


 


Resp  18   09/15/18 12:16


 


BP  158/78   09/15/18 12:16


 


Pulse Ox  100   09/15/18 12:16











Vital Signs Reviewed: Yes





Throat Pain/Nasal Course/Dx





- Course


Course Of Treatment: Healthy 67 y/o F with history of mild sore throat. Rapid 

strep is negative. Differential pharyngitis is strep pharyngitis versus viral 

pharyngitis. My diagnosis is viral pharyngitis. Medications have been included 

in the original chart and reviewed. Hypertensive BP reading of 158/78. Patient 

will follow up with PCP.





- Differential Dx/Diagnosis


Differential Diagnosis/HQI/PQRI: Other - strep pharyngitis versus viral 

pharyngitis


Provider Diagnoses: viral pharyngitis





Discharge





- Sign-Out/Discharge


Documenting (check all that apply): Patient Departure - Patient will be 

discharged home. 


All imaging exams completed and their final reports reviewed: No Studies





- Discharge Plan


Condition: Stable


Disposition: HOME


Patient Education Materials:  Pharyngitis (ED)


Referrals: 


Rigo Ayala MD [Primary Care Provider] - 1 Week


Additional Instructions: 


Your blood pressure reading today was 158/78, indicating HYPERTENSION. Follow-

up with your primary care provider within 4 weeks for blood pressure readings 

and further evaluation. 





PLEASE SEEK CARE AT THE EMERGENCY DEPARTMENT IF SYMPTOMS WORSEN OR IF NEW 

SYMPTOMS DEVELOP. FOLLOW UP WITH YOUR PRIMARY CARE PHYSICIAN.








AS WE DISCUSSED:





Review have a sore throat caused by a virus.








USEFUL HOME REMEDIES:


WARM WATER GARGLES, WITH  TSP OF SALT PER 8 OUNCES OF WATER, GARGLE FOR A FEW 

SECONDS AND SPIT OUT;  GARGLE AND SPIT OUT;  EVERY THREE HOURS.


AND/OR: WARM WATER OR TEA, HONEY AND LEMON; 2-3 CUPS A DAY.


FOR SORE THROAT: KEEP THROAT MOIST WITH LOZENGES;  TEA AND HONEY.  USE WARM 

WATER GARGLES 3-4 TIMES A DAY.


Also: Acetaminophen (1000 mg) and Ibuprofen (400mg) taken at the same time can 

lesson most types of pain.


FOLLOW UP:


RE-CHECK IN 1O DAYS, AS NEEDED, IF YOU ARE NOT IMPROVING.  RETURN HERE OR SEE 

YOUR PHYSICIAN





- Billing Disposition and Condition


Condition: STABLE


Disposition: Home





- Attestation Statements


Document Initiated by Yuliya: Yes


Documenting Scribe: Taina Watson


Provider For Whom Yuliya is Documenting (Include Credential): Dr. Pietro Jones MD


Scribe Attestation: 


Taina CORTES scribed for Dr. Pietro Jones MD on 09/15/18 at 1533. 


Scribe Documentation Reviewed: Yes


Provider Attestation: 


The documentation as recorded by the Taina king accurately reflects 

the service I personally performed and the decisions made by me, Dr. Pietro Jones MD

## 2018-09-16 NOTE — ED
Shortness of Breath





- HPI Summary


HPI Summary: 





. This patient is a 68 year old F presenting to [facility] accompanied by her 

 with a chief complaint of SOB that began a couple hours ago. Pt had a 

head cold for the last couple days and went to her doctor and dx viral illness, 

strep was done there. The patient rates the pain 2/10 in severity. Symptoms 

aggravated by lying. Patient reports fatigue, chills, dry cough, pain with 

inspiration, and reduced appetite. Patient denies fever, sleep disturbance, LE 

edema, CP, and vomiting. Pt states she did have ABD a couple days ago with some 

n/d. Pt has asthma and has been using her inhaler but states this doesnt feel 

like an asthma attack. Pt was seen at the [facility] and was sent for further work 

up due to EKG changes. 





- History of Current Complaint


Chief Complaint: EDShortnessOfBreath


Time Seen by Provider: 09/16/18 22:45


Hx Obtained From: Patient


Onset/Duration: Lasting Hours, Still Present


Timing: Constant


Current Severity: Moderate


Dyspnea At: [facility]


Associated Signs & Symptoms: Negative - , sleep disturbance, LE edema, ABD pain

, CP, and vomiting., Cough (Nonproductive), Chills





- Allergy/Home Medications


Allergies/Adverse Reactions: 


 Allergies











Allergy/AdvReac Type Severity Reaction Status Date / Time


 


Adhesive Tape Allergy Intermediate Rash Verified 09/16/18 22:38


 


amoxicillin Allergy  Rash Verified 09/16/18 22:38


 


clopidogrel [From Plavix] Allergy  See Comment Verified 09/16/18 22:38


 


nitrofurantoin Allergy  Bleeding Verified 09/16/18 22:38


 


pregabalin [From Lyrica] Allergy  Altered Verified 09/16/18 22:38





   Mental  





   Status  


 


Quinolones Allergy  See Comment Verified 09/16/18 22:38


 


sucrose Allergy  Unknown Verified 09/16/18 22:38





   Reaction  





   Details  


 


HFA Allergy  Coughing Uncoded 09/16/18 22:38


 


beta blockers AdvReac Severe See Comment Uncoded 09/16/18 22:38


 


keytac AdvReac Intermediate Diarrhea Uncoded 09/16/18 22:38














PMH/Surg Hx/FS Hx/Imm Hx


Endocrine/Hematology History: Reports: Hx Thyroid Disease - hyperthyroid


   Denies: Hx Diabetes


Cardiovascular History: Reports: Hx Hypertension - BORDERLINE, Other 

Cardiovascular Problems/Disorders - hx viral cardiomyopathy 2004,  cured


   Denies: Hx Congestive Heart Failure, Hx Pacemaker/ICD


Respiratory History: Reports: Hx Asthma


   Denies: Hx Chronic Obstructive Pulmonary Disease (COPD), Other Respiratory 

Problems/Disorders


GI History: Reports: Hx Diverticulosis - and diverticulitis


   Denies: Hx Ulcer


 History: Reports: Other  Problems/Disorders - bladder prolapse


   Denies: Hx Dialysis, Hx Renal Disease


Musculoskeletal History: Reports: Hx Arthritis


   Denies: Hx Rheumatoid Arthritis, Hx Osteoporosis


Sensory History: Reports: Hx Contacts or Glasses


   Denies: Hx Hearing Aid


Opthamlomology History: Reports: Hx Contacts or Glasses


Neurological History: Reports: Other Neuro Impairments/Disorders - L5 nerve 

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


Psychiatric History: Reports: Hx Panic Disorder





- Cancer History


Hx Chemotherapy: No





- Surgical History


Surgery Procedure, Year, and Place: Hysterectomy Feb 08; bladder biopsy, 

BLADDER MESH (PROLAPSE); tubal; sinus surgery; heart cath (no stents);


Infectious Disease History: No


Infectious Disease History: Reports: Hx Hepatitis - Hep A, Infectious in college


   Denies: Hx Human Immunodeficiency Virus (HIV), History Other Infectious 

Disease, Traveled Outside the [place] in Last 30 Days





- Family History


Known Family History: Positive: Hypertension, Other - autoimmune conditions


   Negative: Cardiac Disease





- Social History


Alcohol Use: None


Hx Substance Use: No


Substance Use Type: Reports: None


Hx Tobacco Use: Yes


Smoking Status (MU): Former Smoker





Review of Systems


Constitutional: Negative - sleep disturbance 


Positive: Chills, Fatigue, Other - reduced appetite .  Negative: Fever


Negative: Chest Pain


Positive: Shortness Of Breath, Cough - pain with inspiration


Positive: Abdominal Pain, Diarrhea, Nausea.  Negative: Vomiting


Negative: Edema


All Other Systems Reviewed And Are Negative: Yes





Physical Exam





- Summary


Physical Exam Summary: 








Appearance: Well-appearing, Well-nourished, lying in bed comfortably


Skin: Warm, dry, no obvious rash


Eyes: sclera anicteric, no conjunctival pallor


ENT: mucous membranes moist, pharynx appears normal


Neck: Supple, nontender


Respiratory: Expiratory wheezes inspiratory crackles 


egophony in the right lower lung fields with bronchiole breath sounds 





Cardiovascular: Normal S1, S2. No murmurs. Normal distal pulses in tibial and 

radial bilaterally.


Abdomen: Soft, nontender, normal active bowel sounds present


Musculoskeletal: Normal, Strength/ROM Intact


Neurological: A&Ox3, awake and alert, mentation is normal, speech is fluent and 

appropriate


Psychiatric: affect is normal, does not appear anxious or depressed


 





Triage Information Reviewed: Yes


Vital Signs On Initial Exam: 


 Initial Vitals











Temp Pulse Resp BP Pulse Ox


 


 98.8 F   80   18   147/71   96 


 


 09/16/18 22:35  09/16/18 22:35  09/16/18 22:35  09/16/18 22:35  09/16/18 22:35











Vital Signs Reviewed: Yes





Diagnostics





- Vital Signs


 Vital Signs











  Temp Pulse Resp BP Pulse Ox


 


 09/16/18 22:35  98.8 F  80  18  147/71  96














- Laboratory


Result Diagrams: 


 09/16/18 23:05





 09/16/18 23:05


Lab Statement: Any lab studies that have been ordered have been reviewed, and 

results considered in the medical decision making process.





- Radiology


  ** CXR


Radiology Interpretation Completed By: ED Physician - no acute cardiopulmonary 

disease. Pending official report





- EKG


  ** 22:57


Cardiac Rate: NL


EKG Rhythm: Sinus Rhythm - 70BPM, P waves, QRS complex, and T waves are within 

normal limits, T waves and intervals are normal, no ischemic changes. This is a 

normal EKG





Re-Evaluation





- Re-Evaluation


  ** First Eval


Re-Evaluation Time: 01:05


Change: Unchanged


Comment: I discussed test results and discharge with the patient.





Course/Dx





- Course


Assessment/Plan: This patient is a 68 year old F presenting to [facility] 

accompanied by her  with a chief complaint of SOB that began a couple 

hours ago. Pt had a head cold for the last couple days and went to her doctor 

and dx viral illness, strep was done there. The patient rates the pain 2/10 in 

severity. Symptoms aggravated by lying. Patient reports fatigue, chills, dry 

cough, pain with inspiration, and reduced appetite. Patient denies fever, sleep 

disturbance, LE edema, CP, and vomiting. Pt states she did have ABD a couple 

days ago with some n/d. Pt has asthma and has been using her inhaler but states 

this doesnt feel like an asthma attack. Pt was seen at the [facility] and was sent 

for further work up due to EKG changes.  An EKG reveals at 70BPM, P waves, QRS 

complex, and T waves are within normal limits, T waves and intervals are normal

, no ischemic changes. This is a normal EKG.  CXR reveals, no acute 

cardiopulmonary disease. Pending official report.  Bloodwork obtained.  In the 

ED course the patient was given albuterol.  Patient will be discharged with 

prescription for a z pack and follow up from PCP.  The patient is agreeable 

with this plan.





- Diagnoses


Provider Diagnoses: 


 Bacterial pneumonia








Discharge





- Sign-Out/Discharge


Documenting (check all that apply): Patient Departure





- Discharge Plan


Condition: Good


Disposition: HOME


Prescriptions: 


Azithromycin TAB* [Zithromax TAB (Z-CATRACHO) 250 mg #6 tabs] 250 mg PO DAILY #4 tab


Patient Education Materials:  Bacterial Pneumonia (ED), Bronchospasm (ED)


Referrals: 


Rigo Ayala MD [Primary Care Provider] - 





- Attestation Statements


Document Initiated by Scribe: Yes


Documenting Scribe: Bib Kulkarni 


Provider For Whom Scribe is Documenting (Include Credential): Vinod Willis MD 


Scribe Attestation: 


Bib CORTES , scribed for Vinod Willis MD  on 09/17/18 at 0104.

## 2018-09-16 NOTE — UC
Shortness of Breath HPI





- HPI Summary


HPI Summary: 


The patient is a 67 y/o F presenting to [facility] with a chief complaint of SOB 

starting today. She has been sick with a head cold and sore throat for the past 

few days, and today she noticed that she was panting while trying to take a 

nap. She hasn't felt like her cold has gone to her chest, and she has noticed 

the chest pain radiating to back . Her breathing is alleviated by sitting up 

and aggravated by lying down. There is also pain in her right back without 

trauma, and she has some nasal discharge that she relieved with Afrin nasal 

spray. She also complains of having a jittery sensation as if she has been 

taking a Prednisone steroid, but she has not taken anything. The pain is 

currently rated 3/10 in severity.





- History of Current Complaint


Chief Complaint: UCChestPain


Stated Complaint: SOB


Time Seen by Provider: 09/16/18 21:23


Hx Obtained From: Patient


Pregnant?: No


Onset/Duration: Sudden Onset, Lasting Hours - starting today, Still Present


Timing: Constant


Current Severity: Moderate


Dyspnea At: [facility]


Aggrevating Factors: Other - lying down


Alleviating Factors: Upright Position, Other - Afrin for rhinorrhea


Associated Signs & Symptoms: Positive: Other - POSITIVE: pain in her right back 

without trauma, nasal discharge, jittery sensation; NEGATIVE: CP





- Allergy/Home Medications


Allergies/Adverse Reactions: 


 Allergies











Allergy/AdvReac Type Severity Reaction Status Date / Time


 


Adhesive Tape Allergy Intermediate Rash Verified 09/16/18 22:38


 


amoxicillin Allergy  Rash Verified 09/16/18 22:38


 


clopidogrel [From Plavix] Allergy  See Comment Verified 09/16/18 22:38


 


nitrofurantoin Allergy  Bleeding Verified 09/16/18 22:38


 


pregabalin [From Lyrica] Allergy  Altered Verified 09/16/18 22:38





   Mental  





   Status  


 


Quinolones Allergy  See Comment Verified 09/16/18 22:38


 


sucrose Allergy  Unknown Verified 09/16/18 22:38





   Reaction  





   Details  


 


HFA Allergy  Coughing Uncoded 09/16/18 22:38


 


beta blockers AdvReac Severe See Comment Uncoded 09/16/18 22:38


 


keytac AdvReac Intermediate Diarrhea Uncoded 09/16/18 22:38














PMH/Surg Hx/FS Hx/Imm Hx


Previously Healthy: Yes


Endocrine History: Hyperthyroidism


Cardiovascular History: Hypertension - borderline, Other


Other Cardiovascular History: Cardiomyopathy


Other Respiratory History: negative


GI/ History: Diverticulitis, Other


Other GI/ History: Diverticulosis, bladder prolapse


Other Neurological History: negative


Other Psychological History: negative


Other Cancer History: negative





- Surgical History


Surgical History: Yes


Surgery Procedure, Year, and Place: Hysterectomy Feb 08; bladder biopsy, 

BLADDER MESH (PROLAPSE); tubal; sinus surgery; heart cath (no stents);





- Family History


Known Family History: Positive: Hypertension, Other - autoimmune conditions


   Negative: Cardiac Disease


Family History: R & n/C





- Social History


Alcohol Use: None


Substance Use Type: None


Smoking Status (MU): Former Smoker





- Immunization History


Most Recent Influenza Vaccination: Fall 2014


Most Recent Tetanus Shot: Within last 10 years


Most Recent Pneumonia Vaccination: None





Review of Systems


Constitutional: Other - jittery feeling


Skin: Negative


Eyes: Negative


ENT: Nasal Discharge


Respiratory: Shortness Of Breath


Cardiovascular: Negative, Chest Pain - radiating to the back on right side., 

Other - NEGATIVE: chest pain


Gastrointestinal: Negative


Genitourinary: Negative


Motor: Negative


Neurovascular: Negative


Musculoskeletal: Other: - pain in the right lower back


Neurological: Negative


Psychological: Negative


Is Patient Immunocompromised?: No


All Other Systems Reviewed And Are Negative: Yes





Physical Exam





- Summary


Physical Exam Summary: 


Appearance: Well-Appearing, No Pain Distress, Well-Nourished


Eyes: conjunctiva clear, no discharge


ENT: Hearing grossly normal, no muffled/hoarse voice. 


Neck: Normal, Supple


Respiratory/Lung Sounds: Lungs clear, Normal breath sounds, No respiratory 

distress, No accessory muscle use


Cardiovascular: RRR, No murmur


Abdomen: Nontender, Soft, no guarding, not distended


Bowel Sounds: Present


Musculoskeletal: Normal


Neurological: Alert, muscle tone normal


Psychiatric:Normal, age appropriate behavior


Skin: Normal, Warm, Dry, Normal color


Triage Information Reviewed: Yes


Vital Signs: 


 Initial Vital Signs











Temp  98.3 F   09/16/18 21:29


 


Pulse  77   09/16/18 21:29


 


Resp  18   09/16/18 21:29


 


BP  129/52   09/16/18 21:29


 


Pulse Ox  97   09/16/18 21:29











Vital Signs Reviewed: Yes





Diagnostics





- EKG


EKG Comments: 





NSR, QRS normal  , LAFB - was note din 2017 as well. 


Cardiac Rate: NL


Cardiac Rhythm: Sinus: Normal


Ectopy: None


ST Segment: Normal


EKG Comparison: Other - COmparison from 2017- no significnat change.  Minor 

changes.





Shortness of Breath Dx





- Course


Course Of Treatment: During the visit today,  we  obtained  EKG which shoed 

some changes when compared to last year. She does have SOB that worsens on 

laying down , unsure if she is haveing CHF but there were no crackles on 

auscuslation.. We discussed the findings and further plan. Her  withh 

dire ther to ER to rule out any cardiac ischemia- NSTEMI.  Patient expressed 

understanding .





- Differential Dx/Diagnosis


Provider Diagnoses: Shortness of breath.  Chest pain





Discharge





- Sign-Out/Discharge


Documenting (check all that apply): Patient Departure - Patient will be 

discharged with instruction to immediately go to ED.


All imaging exams completed and their final reports reviewed: No Studies





- Discharge Plan


Condition: Stable


Disposition: HOME-RECOMMEND TO ED


Referrals: 


Rigo Ayala MD [Primary Care Provider] - 1 Week


Additional Instructions: 


Given her continued symptoms, recommend going to the ER for workup.


Her  will drive her to ER


Report was called to the ER 





- Billing Disposition and Condition


Condition: STABLE


Disposition: Home-Recommend to ED





- Attestation Statements


Document Initiated by Scribe: Yes


Documenting Scribe: Taina Watson


Provider For Whom Lose is Documenting (Include Credential): Dr. Susan Grullon MD


Scribe Attestation: 


Taina CORTES scribed for Dr. Susan Grullon MD on 09/17/18 at 0112. 


Scribe Documentation Reviewed: Yes


Provider Attestation: 


The documentation as recorded by the Taina king accurately reflects 

the service I personally performed and the decisions made by me, Dr. Susan Grullon MD

## 2018-09-17 NOTE — RAD
INDICATION: Shortness of breath. History of viral cardiomyopathy. History of tobacco use.



COMPARISON: June 19, 2017



TECHNIQUE:  Dual energy PA and routine lateral views of the chest were obtained.



REPORT: Elevated lung volumes and patchy rarefaction of the mid to upper lung zone

interstitial markings.  Minimal linear subsegmental atelectasis at the LEFT lung base. The

lungs and pleural spaces are otherwise clear. Negative for pneumothorax.  The heart,

pulmonary vasculature, and mediastinal contours are unremarkable. Mild thoracic

degenerative spondylosis. 

IMPRESSION: 

#. Stigmata of obstructive lung disease. 

#. Minimal LEFT basilar atelectasis.



R0

## 2018-12-12 NOTE — ED
HPI Chest Pain





- HPI Summary


HPI Summary: 





A 67 y/o female presents to [facility] with a chief complaint of mid-sternal chest 

pressure since 12/09/18. She claims this pressure is intermittent and not 

radiating. She states that the pressure is not really a pain, rating her pain 

as a 1/10. She admits to having a mild HA and SOB with exertion. She states 

that exertion worsens her pain. She denies dizziness, syncope, diaphoresis and 

nausea. She has a Hx of cardiomyopathy in 2004 which she recivered from. She 

also has a Hx of CVA in 2015. He claims that she takes one baby aspirin daily. 

She is a former smoker but denies recent EtOH use. The morning of 12/12/18 she 

called her PCP who referred her to the ED.





- History of Current Complaint


Chief Complaint: EDChestWallPain


Time Seen by Provider: 12/12/18 11:23


Hx Obtained From: Patient


Onset/Duration: Started Days Ago, Still Present


Timing: Intermittent


Initial Severity: Mild


Current Severity: Mild


Pain Intensity: 1


Pain Scale Used: 0-10 Numeric


Chest Pain Location: Mid Sternal


Chest Pain Radiates: No


Character: Pressure/Squeezing


Aggravating Factor(s): Exertion


Alleviating Factor(s): Nothing


Associated Signs and Symptoms: Positive: Headaches, Shortness of Breath.  

Negative: Dizziness, Syncope, Diaphoresis, Nausea





- Additional Pertinent History


Primary Care Physician: [id number]





- Allergy/Home Medications


Allergies/Adverse Reactions: 


 Allergies











Allergy/AdvReac Type Severity Reaction Status Date / Time


 


Adhesive Tape Allergy Intermediate Rash Verified 09/16/18 22:38


 


amoxicillin Allergy  Rash Verified 09/16/18 22:38


 


clopidogrel [From Plavix] Allergy  See Comment Verified 09/16/18 22:38


 


nitrofurantoin Allergy  Bleeding Verified 09/16/18 22:38


 


pregabalin [From Lyrica] Allergy  Altered Verified 09/16/18 22:38





   Mental  





   Status  


 


Quinolones Allergy  See Comment Verified 09/16/18 22:38


 


sucrose Allergy  Unknown Verified 09/16/18 22:38





   Reaction  





   Details  


 


HFA Allergy  Coughing Uncoded 09/16/18 22:38


 


beta blockers AdvReac Severe See Comment Uncoded 09/16/18 22:38


 


keytac AdvReac Intermediate Diarrhea Uncoded 09/16/18 22:38











Home Medications: 


 Home Medications





Cefdinir cap (NF) [Cefdinir 300 MG cap (NF)] 300 mg PO BID 12/12/18 [History 

Confirmed 12/12/18]











PMH/Surg Hx/FS Hx/Imm Hx


Endocrine/Hematology History: Reports: Hx Thyroid Disease - hyperthyroid


   Denies: Hx Diabetes


Cardiovascular History: Reports: Hx Hypertension - BORDERLINE, Other 

Cardiovascular Problems/Disorders - hx viral cardiomyopathy 2004,  cured


   Denies: Hx Congestive Heart Failure, Hx Pacemaker/ICD


Respiratory History: Reports: Hx Asthma


   Denies: Hx Chronic Obstructive Pulmonary Disease (COPD), Other Respiratory 

Problems/Disorders


GI History: Reports: Hx Diverticulosis - and diverticulitis


   Denies: Hx Ulcer


 History: Reports: Other  Problems/Disorders - bladder prolapse


   Denies: Hx Dialysis, Hx Renal Disease


Musculoskeletal History: Reports: Hx Arthritis


   Denies: Hx Rheumatoid Arthritis, Hx Osteoporosis


Sensory History: Reports: Hx Contacts or Glasses


   Denies: Hx Hearing Aid


Opthamlomology History: Reports: Hx Contacts or Glasses


Neurological History: Reports: Hx CVA, Other Neuro Impairments/Disorders - L5 

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


Psychiatric History: Reports: Hx Panic Disorder





- Cancer History


Hx Chemotherapy: No





- Surgical History


Surgery Procedure, Year, and Place: Hysterectomy Feb 08; bladder biopsy, 

BLADDER MESH (PROLAPSE); tubal; sinus surgery; heart cath (no stents);


Infectious Disease History: No


Infectious Disease History: Reports: Hx Hepatitis - Hep A, Infectious in college


   Denies: Hx Human Immunodeficiency Virus (HIV), History Other Infectious 

Disease, Traveled Outside the [place] in Last 30 Days





- Family History


Known Family History: Positive: Hypertension, Other - autoimmune conditions


   Negative: Cardiac Disease





- Social History


Alcohol Use: None


Hx Substance Use: No


Substance Use Type: Reports: None


Hx Tobacco Use: Yes


Smoking Status (MU): Former Smoker





Review of Systems


Negative: Skin Diaphoresis


Positive: Chest Pain - but patient describes it as more of a "pressure" without 

pain


Negative: Nausea


Neurological: Negative - dizziness


Positive: Headache.  Negative: Syncope


All Other Systems Reviewed And Are Negative: Yes





Physical Exam





- Summary


Physical Exam Summary: 





VITAL SIGNS: Reviewed.


GENERAL: Patient is a well-developed and nourished FEMALE who is lying 

comfortable in the stretcher. Patient is not in any acute respiratory distress.


HEAD AND FACE: No signs of trauma. No ecchymosis, hematomas or skull 

depressions. No sinus tenderness.


EYES: PERRLA, EOMI x 2, No injected conjunctiva, no nystagmus.


EARS: Hearing grossly intact. Ear canals and tympanic membranes are within 

normal limits.


MOUTH: Oropharynx within normal limits.


NECK: Supple, trachea is midline, no adenopathy, no JVD, no carotid bruit, no c-

spine tenderness, neck with full ROM.


CHEST: Symmetric, no tenderness at palpation


LUNGS: Clear to auscultation bilaterally. No wheezing or crackles.


CVS: Regular rate and rhythm, S1 and S2 present, no murmurs or gallops 

appreciated.


ABDOMEN: Soft, non-tender. No signs of distention. No rebound no guarding, and 

no masses palpated. Bowel sounds are normal.


EXTREMITIES: FROM in all major joints, no edema, no cyanosis or clubbing.


NEURO: Alert and oriented x 3. No acute neurological deficits. Speech is normal 

and follows commands.


SKIN: Dry and warm


Triage Information Reviewed: Yes


Vital Signs On Initial Exam: 


 Initial Vitals











Temp Pulse Resp BP Pulse Ox


 


 97.4 F   67   18   146/68   96 


 


 12/12/18 11:19  12/12/18 11:19  12/12/18 11:19  12/12/18 11:19  12/12/18 11:19











Vital Signs Reviewed: Yes





Diagnostics





- Vital Signs


 Vital Signs











  Temp Pulse Resp BP Pulse Ox


 


 12/12/18 11:19  97.4 F  67  18  146/68  96














- Laboratory


Result Diagrams: 


 12/12/18 12:16





 12/12/18 12:16


Lab Statement: Any lab studies that have been ordered have been reviewed, and 

results considered in the medical decision making process.





- Radiology


  ** CXR


Radiology Interpretation Completed By: Radiologist


Summary of Radiographic Findings: No evidence for acute disease. ED physician 

has reviewed this imaging report.





- CT


  ** Chest/thorax CTA


CT Interpretation Completed By: Radiologist


Summary of CT Findings: NO PULMONARY ARTERIAL FILLING DEFECT TO SUGGEST 

PULMONARY EMBOLISM.  ED physician has reviewed this imaging report.





- EKG


  ** 11:55


Cardiac Rate: NL - 61 bpm


EKG Rhythm: Sinus Rhythm


Summary of EKG Findings: no ST elevations. Similar to EKG done 09/16/18.





Re-Evaluation





- Re-Evaluation


  ** First Eval


Re-Evaluation Time: 14:55


Change: Improved


Comment: Patient is ready for discharge.





Chest Pain Course/Dx





- Course


Assessment/Plan: A 67 y/o female presents to [facility] with a chief complaint of mid

-sternal chest pressure since 12/09/18. She claims this pressure is 

intermittent and not radiating. She states that the pressure is not really a 

pain, rating her pain as a 1/10. She admits to having a mild HA and SOB with 

exertion. She states that exertion worsens her pain. She denies dizziness, 

syncope, diaphoresis and nausea. She has a Hx of cardiomyopathy in 2004 which 

she recovered from. She also has a Hx of CVA in 2015. He claims that she takes 

one baby aspirin daily. She is a former smoker but denies recent EtOH use. The 

morning of 12/12/18 she called her PCP who referred her to the ED.  Blood work 

without any significant abnormality except for d-dimer at 338, potassium at 3.4 

and troponin of 0.00.  urinalysis is negative for UTI.  EKG is a normal sinus 

rhythm without any ST elevations.  Chest x-ray impression: no acute disease.  

Because the patient is having and increase d-dimer I decided to do a chest CT 

to rule out a PE.  Chest CTA impression: Negative for a PE.  Second troponin is 

also 0.00.  + At this point the patient is feeling better and therefore, the 

patient was discharged home with follow-up PCP.  I discussed all the findings 

and test results with the patient. Patient was instructed to return to the 

emergency room immediately if any of the symptoms return or worsens. Plan of 

care was discussed with the patient and understands and agrees. All questions 

were answered at patient satisfaction.  There were no further complaints or 

concerns.  Lung exam before discharge: CTA B/L. Good air exchange. No wheezing 

or crackles heard. CVS: S1 and S2 present. No murmurs appreciated. Patient is 

alert and oriented x 3. Patient is hemodynamically stable. Patient will be 

discharged home with follow up PCP in the next 2-3 days





- Chest Pain


Differential Diagnosis/HQI/PQRI: Acute MI, ACS, Angina, CHF, Chest Wall, GI 

Disease, Lower Respiratory Infection, Pulmonary Edema, Pulmonary Embolism





- Diagnoses


Provider Diagnoses: 


 Atypical chest pain








Discharge





- Sign-Out/Discharge


Documenting (check all that apply): Patient Departure - DC





- Discharge Plan


Condition: Stable


Disposition: HOME


Referrals: 


Rigo Ayala MD [Primary Care Provider] - 3 Days


()


Additional Instructions: 


Return to the ED for any new or worsening symptoms.





- Billing Disposition and Condition


Condition: STABLE


Disposition: Home





- Attestation Statements


Document Initiated by Scribe: Yes


Documenting Scribe: Michael Norwood


Provider For Whom Justinoibbrianda is Documenting (Include Credential): Casimiro Flanagan MD


Scribe Attestation: 


IMichael, scribed for Casimiro Flanagan MD on 12/13/18 at 1030. 


Scribe Documentation Reviewed: Yes


Provider Attestation: 


The documentation as recorded by the Michael king accurately 

reflects the service I personally performed and the decisions made by me, 

Casimiro Flanagan MD


Status of Scribe Document: Viewed





Attestations


User Type: Provider with Scribe


Provider Attestation: The documentation recorded by the scribe accurately 

reflects the service I personally performed and the decisions made by me.

## 2019-07-19 NOTE — UC
Shortness of Breath HPI





- HPI Summary


HPI Summary: 





70 yo female with 2 day hx of exertion dyspnea associated with mild chest 

pressure





gets wind going up a flight of stairs





no n/v


no diaphoresis





has felt fatigues





hx cardiomyopathy





no hx CAD





- History of Current Complaint


Chief Complaint: UCChestPain


Stated Complaint: CHEST PAIN


Time Seen by Provider: 07/19/19 13:09


Hx Obtained From: Patient


Onset/Duration: Sudden Onset, Lasting Hours - <15 miutes


Current Severity: None


Dyspnea At: Exertion


Aggravating Factors: Other - stairs/house work


Alleviating Factors: Spontaneous Resolution - spontaneous resolution


Associated Signs & Symptoms: Positive: Other - chest pressure when dyspneic





- Allergy/Home Medications


Allergies/Adverse Reactions: 


 Allergies











Allergy/AdvReac Type Severity Reaction Status Date / Time


 


Adhesive Tape Allergy Intermediate Rash Verified 07/19/19 13:09


 


amoxicillin Allergy  Rash Verified 07/19/19 13:09


 


clopidogrel [From Plavix] Allergy  See Comment Verified 07/19/19 13:09


 


nitrofurantoin Allergy  Bleeding Verified 07/19/19 13:09


 


pregabalin [From Lyrica] Allergy  Altered Verified 07/19/19 13:09





   Mental  





   Status  


 


Quinolones Allergy  See Comment Verified 07/19/19 13:09


 


sucrose Allergy  Unknown Verified 07/19/19 13:09





   Reaction  





   Details  


 


HFA Allergy  Coughing Uncoded 07/19/19 13:09


 


beta blockers AdvReac Severe See Comment Uncoded 07/19/19 13:09


 


keytac AdvReac Intermediate Diarrhea Uncoded 07/19/19 13:09














PMH/Surg Hx/FS Hx/Imm Hx


Previously Healthy: Yes


Cardiovascular History: Hypertension


Other Cardiovascular History: cardiomyopathy yrs ago


Respiratory History: Asthma


Neurological History: CVA





- Surgical History


Surgical History: Yes


Surgery Procedure, Year, and Place: Hysterectomy Feb 08; bladder biopsy, 

BLADDER MESH (PROLAPSE); tubal; sinus surgery; heart cath (no stents);





- Family History


Known Family History: Positive: Hypertension, Other - autoimmune conditions


   Negative: Cardiac Disease





- Social History


Alcohol Use: None


Substance Use Type: None


Smoking Status (MU): Former Smoker





- Immunization History


Most Recent Influenza Vaccination: Fall 2014


Most Recent Tetanus Shot: Within last 10 years


Most Recent Pneumonia Vaccination: None





Review of Systems


All Other Systems Reviewed And Are Negative: Yes


Constitutional: Positive: Negative


Skin: Positive: Negative


Eyes: Positive: Negative


ENT: Positive: Negative


Respiratory: Positive: Shortness Of Breath - with exertion


Cardiovascular: Positive: Chest Pain - pressure when dysneic


Gastrointestinal: Positive: Negative


Genitourinary: Positive: Negative


Motor: Positive: Negative


Neurovascular: Positive: Negative


Musculoskeletal: Positive: Negative


Neurological: Positive: Negative


Psychological: Positive: Negative





Physical Exam


Triage Information Reviewed: Yes


Appearance: Well-Appearing, No Pain Distress, Well-Nourished


Vital Signs: 


 Initial Vital Signs











Temp  97.9 F   07/19/19 13:06


 


Pulse  80   07/19/19 13:06


 


Resp  18   07/19/19 13:06


 


BP  149/57   07/19/19 13:06


 


Pulse Ox  97   07/19/19 13:06











Vital Signs Reviewed: Yes


Eyes: Positive: Conjunctiva Clear


ENT: Positive: Hearing grossly normal.  Negative: Nasal congestion, Nasal 

drainage, Trismus, Muffled voice, Hoarse voice


Neck: Positive: Supple, Nontender


Respiratory: Positive: No respiratory distress, No accessory muscle use, 

Wheezing - scatterred


Cardiovascular: Positive: RRR.  Negative: Tachycardia, Bradycardia


Abdomen Description: Positive: Nontender, No Organomegaly, Soft


Musculoskeletal: Positive: ROM Intact, No Edema


Neurological: Positive: Alert


Psychological Exam: Normal





Diagnostics





- EKG


Cardiac Rate: NL


Cardiac Rhythm: Sinus: Normal


Ectopy: None


ST Segment: Normal


EKG Comparison: No Significant Change - LAD and LAHB





Shortness of Breath Dx





- Course


Course Of Treatment: 





I informed patient that her symptoms may be due to angina and suggest further 

evaluation





she is currently asymptomatic and wishes to let her  drive her











- Differential Dx/Diagnosis


Provider Diagnosis: 


 Dyspnea on exertion








Discharge





- Sign-Out/Discharge


Documenting (check all that apply): Patient Departure


All imaging exams completed and their final reports reviewed: No Studies





- Discharge Plan


Condition: Stable


Disposition: HOME


Referrals: 


Almita Smith MD [Primary Care Provider] - 


Additional Instructions: 


I suggest you go directly to the ER for evaluation of your shortness of breath 

with exertion and chest pressure











- Billing Disposition and Condition


Condition: STABLE


Disposition: Home

## 2020-03-01 ENCOUNTER — HOSPITAL ENCOUNTER (EMERGENCY)
Dept: HOSPITAL 25 - ED | Age: 70
Discharge: HOME | End: 2020-03-01
Payer: MEDICARE

## 2020-03-01 VITALS — SYSTOLIC BLOOD PRESSURE: 133 MMHG | DIASTOLIC BLOOD PRESSURE: 68 MMHG

## 2020-03-01 DIAGNOSIS — Z88.1: ICD-10-CM

## 2020-03-01 DIAGNOSIS — J09.X2: Primary | ICD-10-CM

## 2020-03-01 DIAGNOSIS — Z88.0: ICD-10-CM

## 2020-03-01 DIAGNOSIS — J45.909: ICD-10-CM

## 2020-03-01 DIAGNOSIS — E05.90: ICD-10-CM

## 2020-03-01 DIAGNOSIS — Z79.899: ICD-10-CM

## 2020-03-01 DIAGNOSIS — Z87.891: ICD-10-CM

## 2020-03-01 DIAGNOSIS — F41.0: ICD-10-CM

## 2020-03-01 DIAGNOSIS — I10: ICD-10-CM

## 2020-03-01 DIAGNOSIS — Z88.8: ICD-10-CM

## 2020-03-01 DIAGNOSIS — Z86.73: ICD-10-CM

## 2020-03-01 DIAGNOSIS — Z79.82: ICD-10-CM

## 2020-03-01 LAB
ALBUMIN SERPL BCG-MCNC: 4.5 G/DL (ref 3.2–5.2)
ALBUMIN/GLOB SERPL: 1.4 {RATIO} (ref 1–3)
ALP SERPL-CCNC: 85 U/L (ref 34–104)
ALT SERPL W P-5'-P-CCNC: 13 U/L (ref 7–52)
ANION GAP SERPL CALC-SCNC: 8 MMOL/L (ref 2–11)
AST SERPL-CCNC: 21 U/L (ref 13–39)
BASOPHILS # BLD AUTO: 0 10^3/UL (ref 0–0.2)
BUN SERPL-MCNC: 14 MG/DL (ref 6–24)
BUN/CREAT SERPL: 16.7 (ref 8–20)
CALCIUM SERPL-MCNC: 9.7 MG/DL (ref 8.6–10.3)
CHLORIDE SERPL-SCNC: 99 MMOL/L (ref 101–111)
EOSINOPHIL # BLD AUTO: 0 10^3/UL (ref 0–0.6)
FLUAV RNA SPEC QL NAA+PROBE: POSITIVE
GLOBULIN SER CALC-MCNC: 3.2 G/DL (ref 2–4)
GLUCOSE SERPL-MCNC: 125 MG/DL (ref 70–100)
HCO3 SERPL-SCNC: 30 MMOL/L (ref 22–32)
HCT VFR BLD AUTO: 40 % (ref 35–47)
HGB BLD-MCNC: 13.6 G/DL (ref 12–16)
LYMPHOCYTES # BLD AUTO: 0.3 10^3/UL (ref 1–4.8)
MAGNESIUM SERPL-MCNC: 1.7 MG/DL (ref 1.9–2.7)
MCH RBC QN AUTO: 31 PG (ref 27–31)
MCHC RBC AUTO-ENTMCNC: 34 G/DL (ref 31–36)
MCV RBC AUTO: 90 FL (ref 80–97)
MONOCYTES # BLD AUTO: 0.6 10^3/UL (ref 0–0.8)
NEUTROPHILS # BLD AUTO: 7.9 10^3/UL (ref 1.5–7.7)
NRBC # BLD AUTO: 0 10^3/UL
NRBC BLD QL AUTO: 0.1
PLATELET # BLD AUTO: 277 10^3/UL (ref 150–450)
POTASSIUM SERPL-SCNC: 3 MMOL/L (ref 3.5–5)
PROT SERPL-MCNC: 7.7 G/DL (ref 6.4–8.9)
RBC # BLD AUTO: 4.4 10^6 /UL (ref 3.7–4.87)
RBC UR QL AUTO: (no result)
SODIUM SERPL-SCNC: 137 MMOL/L (ref 135–145)
VIT C UR QL: (no result)
WBC # BLD AUTO: 8.8 10^3/UL (ref 3.5–10.8)
WBC UR QL AUTO: (no result)

## 2020-03-01 PROCEDURE — 80053 COMPREHEN METABOLIC PANEL: CPT

## 2020-03-01 PROCEDURE — 81015 MICROSCOPIC EXAM OF URINE: CPT

## 2020-03-01 PROCEDURE — 87086 URINE CULTURE/COLONY COUNT: CPT

## 2020-03-01 PROCEDURE — 99283 EMERGENCY DEPT VISIT LOW MDM: CPT

## 2020-03-01 PROCEDURE — 81003 URINALYSIS AUTO W/O SCOPE: CPT

## 2020-03-01 PROCEDURE — 36415 COLL VENOUS BLD VENIPUNCTURE: CPT

## 2020-03-01 PROCEDURE — 71046 X-RAY EXAM CHEST 2 VIEWS: CPT

## 2020-03-01 PROCEDURE — 83605 ASSAY OF LACTIC ACID: CPT

## 2020-03-01 PROCEDURE — 83735 ASSAY OF MAGNESIUM: CPT

## 2020-03-01 PROCEDURE — 85025 COMPLETE CBC W/AUTO DIFF WBC: CPT

## 2020-03-01 PROCEDURE — 86140 C-REACTIVE PROTEIN: CPT

## 2020-03-01 PROCEDURE — 96361 HYDRATE IV INFUSION ADD-ON: CPT

## 2020-03-01 PROCEDURE — 96360 HYDRATION IV INFUSION INIT: CPT

## 2020-03-01 NOTE — ED
HPI Febrile Illness





- HPI Summary


HPI Summary: 


This patient is a 70 year old female presenting to [facility] with a chief complaint 

of febrile illness since last night. She reports dizziness, cough last night,  

fatigue, sore throat, nausea, and vomiting.  She has a Hx of CVA. She states 

her fever was 101.5 F. She states she has not been to [place]. 





- History of Current Complaint


Chief Complaint: EDGeneral


Time Seen by Provider: 03/01/20 12:20


Hx Obtained From: Patient


Timing: Lasting Days


Pain Intensity: 0


Pain Scale Used: 0-10 Numeric


Associated Signs and Symptoms: Cough





- Additional Pertinent History


Primary Care Physician: [id number]





- Allergy/Home Medications


Allergies/Adverse Reactions: 


 Allergies











Allergy/AdvReac Type Severity Reaction Status Date / Time


 


Adhesive Tape Allergy Intermediate Rash Verified 03/01/20 11:09


 


amoxicillin Allergy  Rash Verified 03/01/20 11:09


 


clopidogrel [From Plavix] Allergy  See Comment Verified 03/01/20 11:09


 


nitrofurantoin Allergy  Bleeding Verified 03/01/20 11:09


 


pregabalin [From Lyrica] Allergy  Altered Verified 03/01/20 11:09





   Mental  





   Status  


 


Quinolones Allergy  See Comment Verified 03/01/20 11:09


 


sucrose Allergy  Unknown Verified 03/01/20 11:09





   Reaction  





   Details  


 


HFA Allergy  Coughing Uncoded 03/01/20 11:09


 


beta blockers AdvReac Severe See Comment Uncoded 03/01/20 11:09


 


keytac AdvReac Intermediate Diarrhea Uncoded 03/01/20 11:09











Home Medications: 


 Home Medications





Montelukast Sodium TAB* [Singulair 10 MG TAB*] 10 mg PO BEDTIME 02/15/14 [

History Confirmed 12/12/18]


Budesonide Flexhaler 180 (NF) [Pulmicort Flexhaler 180 mcg/act (NF)] 2 puff INH 

BID 07/14/15 [History Confirmed 12/12/18]


Cholecalciferol TAB* [Vitamin D TAB*] 1,000 unit PO DAILY 07/14/15 [History 

Confirmed 12/12/18]


Fluticasone NASAL SPRAY 50MCG* [Flonase NASAL SPRAY 50MCG*] 1 spray BOTH NARES 

BID 07/14/15 [History Confirmed 12/12/18]


HYDROcodone/ACETAMIN 5-325 MG* [Norco 5-325 TAB*] 1 tab PO BEDTIME 07/14/15 [

History Confirmed 12/12/18]


Aspirin EC TAB* [Ecotrin EC Low Dose 81 MG*] 81 mg PO DAILY 05/31/16 [History 

Confirmed 12/12/18]


Triamterene/HCTZ 37.5-25 MG* [Dyazide CAP*] 1 cap PO DAILY 05/31/16 [History 

Confirmed 12/12/18]


Oseltamivir CAP* [Tamiflu CAP*] 75 mg PO BID #10 cap 03/01/20 [Rx]











PMH/Surg Hx/FS Hx/Imm Hx


Endocrine/Hematology History: Reports: Hx Thyroid Disease - hyperthyroid


   Denies: Hx Diabetes


Cardiovascular History: Reports: Hx Hypertension - BORDERLINE, Other 

Cardiovascular Problems/Disorders - hx viral cardiomyopathy 2004,  cured


   Denies: Hx Congestive Heart Failure, Hx Pacemaker/ICD


Respiratory History: Reports: Hx Asthma


   Denies: Hx Chronic Obstructive Pulmonary Disease (COPD), Other Respiratory 

Problems/Disorders


GI History: Reports: Hx Diverticulosis - and diverticulitis


   Denies: Hx Ulcer


 History: Reports: Other  Problems/Disorders - bladder prolapse


   Denies: Hx Dialysis, Hx Renal Disease


Musculoskeletal History: Reports: Hx Arthritis


   Denies: Hx Rheumatoid Arthritis, Hx Osteoporosis


Sensory History: Reports: Hx Contacts or Glasses


   Denies: Hx Hearing Aid


Opthamlomology History: Reports: Hx Contacts or Glasses


Neurological History: Reports: Hx CVA, Other Neuro Impairments/Disorders - L5 

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


Psychiatric History: Reports: Hx Panic Disorder





- Cancer History


Hx Chemotherapy: No





- Surgical History


Surgery Procedure, Year, and Place: sinus surgery


Infectious Disease History: No


Infectious Disease History: Reports: Hx Hepatitis - Hep A, Infectious in college


   Denies: Hx Human Immunodeficiency Virus (HIV), History Other Infectious 

Disease, Traveled Outside the [place] in Last 30 Days





- Family History


Known Family History: Positive: Hypertension, Other - autoimmune conditions


   Negative: Cardiac Disease





- Social History


Alcohol Use: None


Hx Substance Use: No


Substance Use Type: Reports: None


Hx Tobacco Use: Yes


Smoking Status (MU): Former Smoker





Review of Systems


Positive: Fever, Fatigue


Positive: Sore Throat


Positive: Cough


Positive: Vomiting, Nausea


Neurological/Mental Status: Other - Dizziness


All Other Systems Reviewed And Are Negative: Yes





Physical Exam





- Summary


Physical Exam Summary: 








VITAL SIGNS: Reviewed.


GENERAL: Patient is a well-developed and nourished FEMALE who is lying 

comfortable in the stretcher. Patient is not in any acute respiratory distress.


HEAD AND FACE: No signs of trauma. No ecchymosis, hematomas or skull 

depressions. No sinus tenderness.


EYES: PERRLA, EOMI x 2, No injected conjunctiva, no nystagmus.


EARS: Hearing grossly intact. Ear canals and tympanic membranes are within 

normal limits.


MOUTH: Oropharynx within normal limits.


NECK: Supple, trachea is midline, no adenopathy, no JVD, no carotid bruit, no c-

spine tenderness, neck with full ROM.


CHEST: Symmetric, no tenderness at palpation.


LUNGS: Clear to auscultation bilaterally. No wheezing or crackles.


CVS: Regular rate and rhythm, S1 and S2 present, no murmurs or gallops 

appreciated.


ABDOMEN: Soft, non-tender. No signs of distention. No rebound, no guarding, and 

no masses palpated. Bowel sounds are normal.


EXTREMITIES: FROM in all major joints, no edema, no cyanosis or clubbing.


NEURO: Alert and oriented x 3. No acute neurological deficits. Speech is normal 

and follows commands.


SKIN: Dry and warm.





Triage Information Reviewed: Yes


Vital Signs On Initial Exam: 


 Initial Vitals











Temp Pulse Resp BP Pulse Ox


 


 99.5 F   104   20   149/78   93 


 


 03/01/20 11:04  03/01/20 11:04  03/01/20 11:04  03/01/20 11:04  03/01/20 11:04











Vital Signs Reviewed: Yes





Procedures





- Sedation


Patient Received Moderate/Deep Sedation with Procedure: No





Diagnostics





- Vital Signs


 Vital Signs











  Temp Pulse Resp BP Pulse Ox


 


 03/01/20 11:04  99.5 F  104  20  149/78  93














- Laboratory


Lab Results: 


 Lab Results











  03/01/20 Range/Units





  12:19 


 


WBC  8.8  (3.5-10.8)  10^3/uL


 


RBC  4.40  (3.70-4.87)  10^6 /uL


 


Hgb  13.6  (12.0-16.0)  g/dL


 


Hct  40  (35-47)  %


 


MCV  90  (80-97)  fL


 


MCH  31  (27-31)  pg


 


MCHC  34  (31-36)  g/dL


 


RDW  14  (10-15)  %


 


Plt Count  277  (150-450)  10^3/uL


 


MPV  8.0  (7.4-10.4)  fL


 


Neut % (Auto)  89.0  %


 


Lymph % (Auto)  2.9  %


 


Mono % (Auto)  7.2  %


 


Eos % (Auto)  0.4  %


 


Baso % (Auto)  0.5  %


 


Absolute Neuts (auto)  7.9 H  (1.5-7.7)  10^3/ul


 


Absolute Lymphs (auto)  0.3 L  (1.0-4.8)  10^3/ul


 


Absolute Monos (auto)  0.6  (0-0.8)  10^3/ul


 


Absolute Eos (auto)  0.0  (0-0.6)  10^3/ul


 


Absolute Basos (auto)  0.0  (0-0.2)  10^3/ul


 


Absolute Nucleated RBC  0.0  10^3/ul


 


Nucleated RBC %  0.1  











Result Diagrams: 


 03/01/20 12:19





 03/01/20 12:19


Lab Statement: Any lab studies that have been ordered have been reviewed, and 

results considered in the medical decision making process.





- Radiology


  ** CXR


Radiology Interpretation Completed By: Radiologist


Summary of Radiographic Findings: Stale puncate densities in the lungs could be 

small calcified granulomas in the otherwise nonacute chest x-ray. ED Provider 

has reviewed this report.





Course/Dx





- Course


Assessment/Plan: This patient is a 70 year old female presenting to [facility] with 

a chief complaint of febrile illness since last night. She reports dizziness, 

cough last night,  fatigue, sore throat, nausea, and vomiting.  She has a Hx of 

CVA. She states her fever was 101.5 F.  In the ED course the patient was placed 

in a cardiac monitor, IV access was obtained, IV fluids started.  Blood test w/

o a significant abnormality except for absolute neutrophils of 7.9, potassium 

of 3, crying 99, glucose 125, magnesium 1.7, CRP of 8.16.  Urinalysis is 

contaminated.  Influenza A is positive, influenza B is negative.  Since the 

patients symptoms started only 24 hours ago the patient was given Tamiflu.  

Patient was given IV fluids.  I discussed all the findings and test results 

with the patient. Patient was instructed to return to the emergency room 

immediately if any of the symptoms return worsens. Plan of care was discussed 

with the patient and understands and agrees. All questions were answered at 

patient satisfaction.  There were no further complaints or concerns. Lung exam 

before discharge: CTA B/L. Good air exchange. No wheezing or crackles heard. CVS

: S1 and S2 present. No murmurs appreciated. Patient is alert and oriented x 3. 

Patient is hemodynamically stable. Patient will be discharged home with follow 

up PCP in the next 2-3 days





- Febrile Illness


Differential Diagnoses: Pneumonia, Other: - URI, Influenza, bronchitis





- Diagnoses


Provider Diagnoses: 


 Influenza A








Discharge ED





- Sign-Out/Discharge


Documenting (check all that apply): Patient Departure - Discharge





- Discharge Plan


Condition: Stable


Disposition: HOME


Prescriptions: 


Oseltamivir CAP* [Tamiflu CAP*] 75 mg PO BID #10 cap


Patient Education Materials:  Influenza (ED)


Referrals: 


Almita Smith MD [Primary Care Provider] - 3 Days


Additional Instructions: 


Return to ED with new or worsening symptoms. 





- Billing Disposition and Condition


Condition: STABLE


Disposition: Home





- Attestation Statements


Document Initiated by Scribe: Yes


Documenting Scribe: Dheeraj Chua


Provider For Whom Scribe is Documenting (Include Credential): Casimiro Flanagan MD


Scribe Attestation: 


Dheeraj CORTES scribed for Casimiro Flanagan MD on 03/01/20 at 2103. 


Scribe Documentation Reviewed: Yes


Provider Attestation: 


The documentation as recorded by the Dheeraj king accurately 

reflects the service I personally performed and the decisions made by Casimiro zamora MD


Status of Scribe Document: Viewed

## 2020-03-07 ENCOUNTER — HOSPITAL ENCOUNTER (EMERGENCY)
Dept: HOSPITAL 25 - UCEAST | Age: 70
Discharge: HOME | End: 2020-03-07
Payer: MEDICARE

## 2020-03-07 VITALS — SYSTOLIC BLOOD PRESSURE: 165 MMHG | DIASTOLIC BLOOD PRESSURE: 91 MMHG

## 2020-03-07 DIAGNOSIS — Z87.891: ICD-10-CM

## 2020-03-07 DIAGNOSIS — Z91.09: ICD-10-CM

## 2020-03-07 DIAGNOSIS — J10.1: Primary | ICD-10-CM

## 2020-03-07 DIAGNOSIS — Z88.0: ICD-10-CM

## 2020-03-07 DIAGNOSIS — J98.01: ICD-10-CM

## 2020-03-07 DIAGNOSIS — Z88.8: ICD-10-CM

## 2020-03-07 PROCEDURE — 99212 OFFICE O/P EST SF 10 MIN: CPT

## 2020-03-07 PROCEDURE — 71046 X-RAY EXAM CHEST 2 VIEWS: CPT

## 2020-03-07 PROCEDURE — G0463 HOSPITAL OUTPT CLINIC VISIT: HCPCS

## 2020-03-07 NOTE — UC
Respiratory Complaint HPI





- HPI Summary


HPI Summary: 





Dx with Flu A 6 days ago has history of asthma  fever are resolved but feels 

like she needs to use her neb frequently-





- History of Current Complaint


Chief Complaint: UCRespiratory


Stated Complaint: DIFFICULTY BEATHING,DIAGNOSED FLU A


Time Seen by Provider: 03/07/20 13:04


Hx Obtained From: Patient


Pregnant?: No


Onset/Duration: Gradual Onset, Lasting Days - 6, Still Present


Timing: Constant


Pain Intensity: 0


Character: Cough: Nonproductive


Aggravating Factors: Recumbent Position


Alleviating Factors: Bronchodilator


Associated Signs And Symptoms: Positive: Negative





- Allergies/Home Medications


Allergies/Adverse Reactions: 


 Allergies











Allergy/AdvReac Type Severity Reaction Status Date / Time


 


Adhesive Tape Allergy Intermediate Rash Verified 03/07/20 12:56


 


amoxicillin Allergy  Rash Verified 03/07/20 12:56


 


clopidogrel [From Plavix] Allergy  See Comment Verified 03/07/20 12:56


 


nitrofurantoin Allergy  Bleeding Verified 03/07/20 12:56


 


pregabalin [From Lyrica] Allergy  Altered Verified 03/07/20 12:56





   Mental  





   Status  


 


Quinolones Allergy  See Comment Verified 03/07/20 12:56


 


sucrose Allergy  Unknown Verified 03/07/20 12:56





   Reaction  





   Details  


 


HFA Allergy  Coughing Uncoded 03/07/20 12:56


 


beta blockers AdvReac Severe See Comment Uncoded 03/07/20 12:56


 


keytac AdvReac Intermediate Diarrhea Uncoded 03/07/20 12:56











Home Medications: 


 Home Medications





Montelukast Sodium TAB* [Singulair 10 MG TAB*] 10 mg PO BEDTIME 02/15/14 [

History Confirmed 03/07/20]


Budesonide Flexhaler 180 (NF) [Pulmicort Flexhaler 180 mcg/act (NF)] 2 puff INH 

BID 07/14/15 [History Confirmed 03/07/20]


Cholecalciferol TAB* [Vitamin D TAB*] 1,000 unit PO DAILY 07/14/15 [History 

Confirmed 03/07/20]


Fluticasone NASAL SPRAY 50MCG* [Flonase NASAL SPRAY 50MCG*] 1 spray BOTH NARES 

BID 07/14/15 [History Confirmed 03/07/20]


HYDROcodone/ACETAMIN 5-325 MG* [Norco 5-325 TAB*] 1 tab PO BEDTIME 07/14/15 [

History Confirmed 03/07/20]


Aspirin EC TAB* [Ecotrin EC Low Dose 81 MG*] 81 mg PO DAILY 05/31/16 [History 

Confirmed 03/07/20]


Cefdinir cap* [Cefdinir 300 MG cap (NF)] 300 mg PO BID #20 cap 03/07/20 [Rx]


Ondansetron HCl [Zofran] 4 mg PO QID PRN #12 tablet 03/07/20 [Rx]











PMH/Surg Hx/FS Hx/Imm Hx


Previously Healthy: No


Respiratory History: Asthma





- Surgical History


Surgical History: Yes


Surgery Procedure, Year, and Place: sinus surgery





- Family History


Known Family History: Positive: Hypertension, Other - autoimmune conditions


   Negative: Cardiac Disease





- Social History


Occupation: Retired


Lives: With Family


Alcohol Use: None


Substance Use Type: None


Smoking Status (MU): Former Smoker





- Immunization History


Most Recent Influenza Vaccination: Fall 2014


Most Recent Tetanus Shot: Within last 10 years


Most Recent Pneumonia Vaccination: None





Review of Systems


All Other Systems Reviewed And Are Negative: Yes


Constitutional: Positive: Negative


Skin: Positive: Negative


Eyes: Positive: Negative


ENT: Positive: Negative


Respiratory: Positive: Shortness Of Breath, Cough


Cardiovascular: Positive: Negative


Gastrointestinal: Positive: Negative


Genitourinary: Positive: Negative


Motor: Positive: Negative


Neurovascular: Positive: Negative


Musculoskeletal: Positive: Negative


Neurological/Mental Status: Positive: Negative


Psychological: Positive: Negative


Is Patient Immunocompromised?: No





Physical Exam


Triage Information Reviewed: Yes


Appearance: Well-Appearing, No Pain Distress, Well-Nourished


Vital Signs: 


 Initial Vital Signs











Temp  97.2 F   03/07/20 12:51


 


Pulse  70   03/07/20 12:51


 


Resp  16   03/07/20 12:51


 


BP  165/91   03/07/20 12:51


 


Pulse Ox  98   03/07/20 12:51











Vital Signs Reviewed: Yes


Eye Exam: Normal


Eyes: Positive: Conjunctiva Clear


ENT Exam: Normal


ENT: Positive: Normal ENT inspection, Hearing grossly normal.  Negative: Nasal 

congestion, Trismus, Muffled voice, Hoarse voice


Dental Exam: Normal


Neck exam: Normal


Neck: Positive: Supple, Nontender, No Lymphadenopathy


Respiratory Exam: Normal


Respiratory: Positive: Chest non-tender, Lungs clear, Normal breath sounds, No 

respiratory distress, No accessory muscle use


Cardiovascular Exam: Normal


Cardiovascular: Positive: RRR, No Murmur, Pulses Normal, Brisk Capillary Refill


Abdominal Exam: Normal


Abdomen Description: Positive: Nontender


Musculoskeletal Exam: Normal


Musculoskeletal: Positive: Strength Intact, ROM Intact, No Edema


Neurological Exam: Normal


Neurological: Positive: Alert, Muscle Tone Normal


Psychological Exam: Normal


Skin Exam: Normal





Diagnostics





- Radiology


  ** No standard instances


Radiology Interpretation Completed By: Radiologist - no evidence of pneumonia





Respiratory Course/Dx





- Course


Course Of Treatment: 


discussed options of developing an overlying bacterial PNA vs Bronchospasm---

patient wishing to treat with A/B  will follow with pcp this week continue neb  

and go to ED if symptoms worsen








- Differential Dx/Diagnosis


Provider Diagnosis: 


 Bronchospasm, Influenza A








Discharge ED





- Sign-Out/Discharge


Documenting (check all that apply): Patient Departure


All imaging exams completed and their final reports reviewed: Yes





- Discharge Plan


Condition: Stable


Disposition: HOME


Prescriptions: 


Cefdinir cap* [Cefdinir 300 MG cap (NF)] 300 mg PO BID #20 cap


Ondansetron HCl [Zofran] 4 mg PO QID PRN #12 tablet


 PRN Reason: nausea/vomiting


Patient Education Materials:  Viral Syndrome (ED), Hypertension (ED), 

Bronchospasm (ED)


Referrals: 


Almita Smith MD [Primary Care Provider] - 1 Week





- Billing Disposition and Condition


Condition: STABLE


Disposition: Home